# Patient Record
Sex: FEMALE | Race: BLACK OR AFRICAN AMERICAN | Employment: FULL TIME | URBAN - METROPOLITAN AREA
[De-identification: names, ages, dates, MRNs, and addresses within clinical notes are randomized per-mention and may not be internally consistent; named-entity substitution may affect disease eponyms.]

---

## 2017-06-11 ENCOUNTER — HOSPITAL ENCOUNTER (EMERGENCY)
Age: 32
Discharge: HOME | End: 2017-06-11
Attending: EMERGENCY MEDICINE
Payer: COMMERCIAL

## 2017-06-11 ENCOUNTER — APPOINTMENT (OUTPATIENT)
Dept: GENERAL RADIOLOGY | Age: 32
End: 2017-06-11
Attending: EMERGENCY MEDICINE
Payer: COMMERCIAL

## 2017-06-11 VITALS
HEIGHT: 69 IN | OXYGEN SATURATION: 99 % | RESPIRATION RATE: 14 BRPM | DIASTOLIC BLOOD PRESSURE: 96 MMHG | SYSTOLIC BLOOD PRESSURE: 145 MMHG | HEART RATE: 81 BPM | TEMPERATURE: 98 F | BODY MASS INDEX: 34.29 KG/M2 | WEIGHT: 231.48 LBS

## 2017-06-11 DIAGNOSIS — M54.50 ACUTE MIDLINE LOW BACK PAIN WITHOUT SCIATICA: Primary | ICD-10-CM

## 2017-06-11 LAB
APPEARANCE UR: CLEAR
BACTERIA URNS QL MICRO: NEGATIVE /HPF
BILIRUB UR QL: NEGATIVE
COLOR UR: ABNORMAL
EPITH CASTS URNS QL MICRO: ABNORMAL /LPF
GLUCOSE UR STRIP.AUTO-MCNC: NEGATIVE MG/DL
HGB UR QL STRIP: ABNORMAL
KETONES UR QL STRIP.AUTO: NEGATIVE MG/DL
LEUKOCYTE ESTERASE UR QL STRIP.AUTO: NEGATIVE
NITRITE UR QL STRIP.AUTO: NEGATIVE
PH UR STRIP: 5.5 [PH] (ref 5–8)
PROT UR STRIP-MCNC: NEGATIVE MG/DL
RBC #/AREA URNS HPF: ABNORMAL /HPF (ref 0–5)
SP GR UR REFRACTOMETRY: 1.02 (ref 1–1.03)
UA: UC IF INDICATED,UAUC: ABNORMAL
UROBILINOGEN UR QL STRIP.AUTO: 1 EU/DL (ref 0.2–1)
WBC URNS QL MICRO: ABNORMAL /HPF (ref 0–4)

## 2017-06-11 PROCEDURE — 74011250637 HC RX REV CODE- 250/637: Performed by: PHYSICIAN ASSISTANT

## 2017-06-11 PROCEDURE — 81001 URINALYSIS AUTO W/SCOPE: CPT | Performed by: EMERGENCY MEDICINE

## 2017-06-11 PROCEDURE — 72100 X-RAY EXAM L-S SPINE 2/3 VWS: CPT

## 2017-06-11 PROCEDURE — 99283 EMERGENCY DEPT VISIT LOW MDM: CPT

## 2017-06-11 RX ORDER — METHOCARBAMOL 750 MG/1
750 TABLET, FILM COATED ORAL 4 TIMES DAILY
Qty: 20 TAB | Refills: 0 | Status: SHIPPED | OUTPATIENT
Start: 2017-06-11 | End: 2017-07-03

## 2017-06-11 RX ORDER — OXYCODONE AND ACETAMINOPHEN 5; 325 MG/1; MG/1
1 TABLET ORAL
Qty: 10 TAB | Refills: 0 | Status: SHIPPED | OUTPATIENT
Start: 2017-06-11 | End: 2017-07-03

## 2017-06-11 RX ORDER — DIAZEPAM 5 MG/1
5 TABLET ORAL
Status: COMPLETED | OUTPATIENT
Start: 2017-06-11 | End: 2017-06-11

## 2017-06-11 RX ORDER — OXYCODONE AND ACETAMINOPHEN 5; 325 MG/1; MG/1
1 TABLET ORAL
Status: COMPLETED | OUTPATIENT
Start: 2017-06-11 | End: 2017-06-11

## 2017-06-11 RX ADMIN — OXYCODONE HYDROCHLORIDE AND ACETAMINOPHEN 1 TABLET: 5; 325 TABLET ORAL at 14:25

## 2017-06-11 RX ADMIN — DIAZEPAM 5 MG: 5 TABLET ORAL at 14:25

## 2017-06-11 NOTE — ED NOTES
Patient received discharge instructions and prescriptions and work note from [place]; mana; neuro intact; ambulated from ED accompanied by partner who will drive her home

## 2017-06-11 NOTE — LETTER
[address] 
[facility] EMERGENCY DEPT 
[address] [id number] 
[phone] Work/School Note Date: 6/11/2017 To Whom It May concern: 
 
Yuliya Hazel was seen and treated today in the emergency room by the following provider(s): 
Attending Provider: Logan Ledezma MD 
Physician Assistant: VALENTE Fragoso. Tarah Miller  
 
No work 48 hours. Sincerely, VALENTE Fragoso

## 2017-06-11 NOTE — LETTER
[address] 
[facility] EMERGENCY DEPT 
[address] [place] [address] [address] [address] [phone] Work/School Note Date: 6/11/2017 To Whom It May concern: 
 
Mónica Valenzuela was seen and treated today in the emergency room by the following provider(s): 
Attending Provider: Екатерина Flores MD 
Physician Assistant: VALENTE Gracia. Tarah Miller  
 
No work 48 hours. Sincerely, VALENTE Gracia

## 2017-06-11 NOTE — DISCHARGE INSTRUCTIONS
Back Pain: Care Instructions  Your Care Instructions    Back pain has many possible causes. It is often related to problems with muscles and ligaments of the back. It may also be related to problems with the nerves, discs, or bones of the back. Moving, lifting, standing, sitting, or sleeping in an awkward way can strain the back. Sometimes you don't notice the injury until later. Arthritis is another common cause of back pain. Although it may hurt a lot, back pain usually improves on its own within several weeks. Most people recover in 12 weeks or less. Using good home treatment and being careful not to stress your back can help you feel better sooner. Follow-up care is a key part of your treatment and safety. Be sure to make and go to all appointments, and call your doctor if you are having problems. Its also a good idea to know your test results and keep a list of the medicines you take. How can you care for yourself at home? · Sit or lie in positions that are most comfortable and reduce your pain. Try one of these positions when you lie down:  ¨ Lie on your back with your knees bent and supported by large pillows. ¨ Lie on the floor with your legs on the seat of a sofa or chair. Parth Cherie on your side with your knees and hips bent and a pillow between your legs. ¨ Lie on your stomach if it does not make pain worse. · Do not sit up in bed, and avoid soft couches and twisted positions. Bed rest can help relieve pain at first, but it delays healing. Avoid bed rest after the first day of back pain. · Change positions every 30 minutes. If you must sit for long periods of time, take breaks from sitting. Get up and walk around, or lie in a comfortable position. · Try using a heating pad on a low or medium setting for 15 to 20 minutes every 2 or 3 hours. Try a warm shower in place of one session with the heating pad. · You can also try an ice pack for 10 to 15 minutes every 2 to 3 hours.  Put a thin cloth between the ice pack and your skin. · Take pain medicines exactly as directed. ¨ If the doctor gave you a prescription medicine for pain, take it as prescribed. ¨ If you are not taking a prescription pain medicine, ask your doctor if you can take an over-the-counter medicine. · Take short walks several times a day. You can start with 5 to 10 minutes, 3 or 4 times a day, and work up to longer walks. Walk on level surfaces and avoid hills and stairs until your back is better. · Return to work and other activities as soon as you can. Continued rest without activity is usually not good for your back. · To prevent future back pain, do exercises to stretch and strengthen your back and stomach. Learn how to use good posture, safe lifting techniques, and proper body mechanics. When should you call for help? Call your doctor now or seek immediate medical care if:  · You have new or worsening numbness in your legs. · You have new or worsening weakness in your legs. (This could make it hard to stand up.)  · You lose control of your bladder or bowels. Watch closely for changes in your health, and be sure to contact your doctor if:  · Your pain gets worse. · You are not getting better after 2 weeks. Where can you learn more? Go to http://deonte-leah.info/. Enter Y310 in the search box to learn more about \"Back Pain: Care Instructions. \"  Current as of: May 23, 2016  Content Version: 11.2  © 0398-9859 [employer]. Care instructions adapted under license by [employer] (which disclaims liability or warranty for this information). If you have questions about a medical condition or this instruction, always ask your healthcare professional. [address] any warranty or liability for your use of this information. Deciding About Spinal Manipulation for Low Back Pain  What is spinal manipulation?   Spinal manipulation is a treatment that uses pressure on a joint of the spine. It is also called spinal adjustment. This treatment is used to improve pain and function. Manipulation can be done with the hands or a special device. The careful, controlled force used on the joint can range from gentle to strong, and from slow to rapid. Sometimes other joints of the body are also worked on to help treat the spine. Health care providers who are commonly trained do spinal manipulation include:  · Chiropractic doctors. · Osteopathic doctors. · Physical therapists. · Medical doctors, such as physiatrists. What are burt points about this decision? · Spinal manipulation is done to relieve pain and help the body function better. Like most low back pain treatments, spinal manipulation works for some people but not for others. · Spinal manipulation generally works well to help low back pain. It is safe when performed by a trained health care provider. · All treatments for low back pain have possible side effects. After spinal manipulation, some people feel tired or sore. Serious problems are very rare. · Most people with new low back pain get better with good home treatment like ice, staying active, and doing certain exercises. In 4 to 6 weeks, most people feel better. · No matter what back pain treatment you choose, learn how to take care of your back. Stay active and do exercises that help your muscles better support your joints. Good self-care will help your back stay strong and help you feel better. Why might you choose spinal manipulation? · You want a treatment that involves hands-on contact. · You have tried home treatment for a few weeks, and your pain is not better. · Many people get pain relief from spinal manipulation. · You want a treatment that does not involve medicines. Why might you choose not to have spinal manipulation? · You are not comfortable with hands-on contact. · Most new low back pain improves with time.  You want to wait and see if your pain gets better with good home care. Home care includes ice, staying active, and doing certain exercises. · Like other back pain treatments, spinal manipulation works for some people, but not for others. · Even though serious side effects are very rare, you still worry that they could happen to you. Your decision  Thinking about the facts and your feelings can help you make a decision that is right for you. Be sure you understand the benefits and risks of your options, and think about what else you need to do before you make the decision. Where can you learn more? Go to http://deonte-leah.info/. Enter V561 in the search box to learn more about \"Deciding About Spinal Manipulation for Low Back Pain. \"  Current as of: May 23, 2016  Content Version: 11.2  © 6635-9574 [employer]. Care instructions adapted under license by [employer] (which disclaims liability or warranty for this information). If you have questions about a medical condition or this instruction, always ask your healthcare professional. [place] [address] any warranty or liability for your use of this information. Deciding About Spinal Manipulation for Low Back Pain  What is spinal manipulation? Spinal manipulation is a treatment that uses pressure on a joint of the spine. It is also called spinal adjustment. This treatment is used to improve pain and function. Manipulation can be done with the hands or a special device. The careful, controlled force used on the joint can range from gentle to strong, and from slow to rapid. Sometimes other joints of the body are also worked on to help treat the spine. Health care providers who are commonly trained do spinal manipulation include:  · Chiropractic doctors. · Osteopathic doctors. · Physical therapists. · Medical doctors, such as physiatrists. What are burt points about this decision?   · Spinal manipulation is done to relieve pain and help the body function better. Like most low back pain treatments, spinal manipulation works for some people but not for others. · Spinal manipulation generally works well to help low back pain. It is safe when performed by a trained health care provider. · All treatments for low back pain have possible side effects. After spinal manipulation, some people feel tired or sore. Serious problems are very rare. · Most people with new low back pain get better with good home treatment like ice, staying active, and doing certain exercises. In 4 to 6 weeks, most people feel better. · No matter what back pain treatment you choose, learn how to take care of your back. Stay active and do exercises that help your muscles better support your joints. Good self-care will help your back stay strong and help you feel better. Why might you choose spinal manipulation? · You want a treatment that involves hands-on contact. · You have tried home treatment for a few weeks, and your pain is not better. · Many people get pain relief from spinal manipulation. · You want a treatment that does not involve medicines. Why might you choose not to have spinal manipulation? · You are not comfortable with hands-on contact. · Most new low back pain improves with time. You want to wait and see if your pain gets better with good home care. Home care includes ice, staying active, and doing certain exercises. · Like other back pain treatments, spinal manipulation works for some people, but not for others. · Even though serious side effects are very rare, you still worry that they could happen to you. Your decision  Thinking about the facts and your feelings can help you make a decision that is right for you. Be sure you understand the benefits and risks of your options, and think about what else you need to do before you make the decision. Where can you learn more? Go to http://deonte-leah.info/.   Enter D771 in the search box to learn more about \"Deciding About Spinal Manipulation for Low Back Pain. \"  Current as of: May 23, 2016  Content Version: 11.2  © 9309-8953 [employer]. Care instructions adapted under license by [employer] (which disclaims liability or warranty for this information). If you have questions about a medical condition or this instruction, always ask your healthcare professional. [place] [address] any warranty or liability for your use of this information.

## 2017-06-11 NOTE — ED PROVIDER NOTES
HPI Comments: Gita Deras is a 28 y.o. female with no significant PMHx who presents ambulatory to [address] [facility] ED with cc of acute \"achy\" and constant lower back pain with no radiation x 2 months. Pt did have similar pain a few months ago which resolved without medical intervention. Pt has not been to a physicians office to get evaluated. Pt denies any imaging studies of her back. Pt specifically denies any fall, trauma, Numbness or tingling in the lower extremities, urinary or fecal incontinence, saddle anesthesia, nausea, vomiting. PCP: None    Social Hx: - tobacco (former smoker), +EtOH (socially), - illicit drugs (-). There are no other complaints, changes or physical findings at this time. The history is provided by the patient. No  was used. No past medical history on file. No past surgical history on file. Family History:   Problem Relation Age of Onset    Hypertension Father     Diabetes Father     Hypertension Paternal Aunt     Diabetes Paternal Aunt     Diabetes Paternal Grandmother     Hypertension Paternal Grandmother        Social History     Social History    Marital status: SINGLE     Spouse name: N/A    Number of children: N/A    Years of education: N/A     Occupational History    Not on file. Social History Main Topics    Smoking status: Former Smoker     Packs/day: 1.00    Smokeless tobacco: Never Used    Alcohol use Yes      Comment: socially    Drug use: No    Sexual activity: No     Other Topics Concern    Not on file     Social History Narrative         ALLERGIES: Review of patient's allergies indicates no known allergies. Review of Systems   Constitutional: Negative. Negative for activity change, appetite change, chills, fatigue, fever and unexpected weight change. HENT: Negative. Negative for congestion, hearing loss, rhinorrhea, sneezing and voice change. Eyes: Negative. Negative for pain and visual disturbance. Respiratory: Negative. Negative for apnea, cough, choking, chest tightness and shortness of breath. Cardiovascular: Negative. Negative for chest pain and palpitations. Gastrointestinal: Negative. Negative for abdominal distention, abdominal pain, blood in stool, diarrhea, nausea and vomiting. Genitourinary: Negative. Negative for difficulty urinating, flank pain, frequency and urgency. Musculoskeletal: Positive for back pain. Negative for arthralgias, myalgias and neck stiffness. Skin: Negative. Negative for color change and rash. Neurological: Negative. Negative for dizziness, seizures, syncope, speech difficulty, weakness, numbness and headaches. Negative for saddle anesthesia  Negative for urinary or fecal incontinence   Hematological: Negative for adenopathy. Psychiatric/Behavioral: Negative. Negative for agitation, behavioral problems, dysphoric mood and suicidal ideas. The patient is not nervous/anxious. Vitals:    06/11/17 1242   BP: (!) 145/96   Pulse: 81   Resp: 14   Temp: 98 °F (36.7 °C)   SpO2: 99%   Weight: 105 kg (231 lb 7.7 oz)   Height: 5' 9\" (1.753 m)            Physical Exam   Constitutional: She is oriented to person, place, and time. She appears well-developed and well-nourished. No distress. HENT:   Head: Normocephalic and atraumatic. Right Ear: External ear normal.   Left Ear: External ear normal.   Nose: Nose normal.   Mouth/Throat: Oropharynx is clear and moist. No oropharyngeal exudate. Eyes: Conjunctivae and EOM are normal. Pupils are equal, round, and reactive to light. Right eye exhibits no discharge. Left eye exhibits no discharge. No scleral icterus. Neck: Normal range of motion. Neck supple. No JVD present. No tracheal deviation present. Cardiovascular: Normal rate, regular rhythm, normal heart sounds and intact distal pulses. Exam reveals no gallop and no friction rub. No murmur heard.   Pulmonary/Chest: Effort normal and breath sounds normal. No respiratory distress. She has no wheezes. She has no rales. She exhibits no tenderness. Abdominal: Soft. Bowel sounds are normal. She exhibits no distension and no mass. There is no tenderness. There is no rebound and no guarding. Musculoskeletal: Normal range of motion. She exhibits tenderness. She exhibits no edema. Tenderness lower back. straight leg-raise intact  Neurovascularly intact. Lymphadenopathy:     She has no cervical adenopathy. Neurological: She is alert and oriented to person, place, and time. She has normal reflexes. No cranial nerve deficit. She exhibits normal muscle tone. Coordination normal.   No urinary or fecal incontinence. Skin: Skin is warm and dry. She is not diaphoretic. Psychiatric: She has a normal mood and affect. Her behavior is normal. Judgment and thought content normal.   Nursing note and vitals reviewed. MDM  Number of Diagnoses or Management Options  Acute midline low back pain without sciatica:   Diagnosis management comments: DDx: degenerative disc disease, degenerative joint disease, Sciatica.          Amount and/or Complexity of Data Reviewed  Clinical lab tests: ordered and reviewed  Tests in the radiology section of CPT®: ordered and reviewed  Review and summarize past medical records: yes    Patient Progress  Patient progress: stable    ED Course       Procedures    LABORATORY TESTS:  Recent Results (from the past 12 hour(s))   URINALYSIS W/ REFLEX CULTURE    Collection Time: 06/11/17  1:03 PM   Result Value Ref Range    Color YELLOW/STRAW      Appearance CLEAR CLEAR      Specific gravity 1.022 1.003 - 1.030      pH (UA) 5.5 5.0 - 8.0      Protein NEGATIVE  NEG mg/dL    Glucose NEGATIVE  NEG mg/dL    Ketone NEGATIVE  NEG mg/dL    Bilirubin NEGATIVE  NEG      Blood LARGE (A) NEG      Urobilinogen 1.0 0.2 - 1.0 EU/dL    Nitrites NEGATIVE  NEG      Leukocyte Esterase NEGATIVE  NEG      WBC 0-4 0 - 4 /hpf    RBC 0-5 0 - 5 /hpf    Epithelial cells FEW FEW /lpf    Bacteria NEGATIVE  NEG /hpf    UA:UC IF INDICATED CULTURE NOT INDICATED BY UA RESULT CNI         IMAGING RESULTS:  XR SPINE LUMB 2 OR 3 V   Final Result   Study Result      EXAM: XR SPINE LUMB 2 OR 3 V     INDICATION: Back pain for over one month.     COMPARISON: None.     TECHNIQUE: 3 views lumbar spine.     FINDINGS: Vertebral body heights and intervertebral disc spaces are maintained. There is no abnormality in alignment.     IMPRESSION  IMPRESSION: No acute abnormality. MEDICATIONS GIVEN:  Medications   oxyCODONE-acetaminophen (PERCOCET) 5-325 mg per tablet 1 Tab (1 Tab Oral Given 6/11/17 1425)   diazePAM (VALIUM) tablet 5 mg (5 mg Oral Given 6/11/17 1425)       IMPRESSION:  1. Acute midline low back pain without sciatica        PLAN:  1. Discharge Medication List as of 6/11/2017  2:42 PM      START taking these medications    Details   oxyCODONE-acetaminophen (PERCOCET) 5-325 mg per tablet Take 1 Tab by mouth every six (6) hours as needed for Pain. Max Daily Amount: 4 Tabs., Print, Disp-10 Tab, R-0      methocarbamol (ROBAXIN-750) 750 mg tablet Take 1 Tab by mouth four (4) times daily. , Normal, Disp-20 Tab, R-0         CONTINUE these medications which have NOT CHANGED    Details   terconazole (TERAZOL 3) 0.8 % vaginal cream Insert 1 Applicator into vagina nightly., Disp-20 g, R-0, Normal      prenatal vit-iron fumarate-fa 27-0.8 mg tab tablet Take 1 Tab by mouth daily. Indications: PREVENTION OF FETAL NEURAL TUBE DEFECTS, Normal, Disp-100 Tab, R-6      diazepam (VALIUM) 5 mg tablet Take 0.5 Tabs by mouth every eight (8) hours as needed (Pain). , Print, Disp-5 Tab, R-0           2.    Follow-up Information     Follow up With Details Comments [address], [place] In 2 days As needed [address] [place] [address]  [facility]  [phone]          Return to ED if worse     DISCHARGE NOTE  2:42 PM  The patient has been re-evaluated and is ready for discharge. Reviewed available results with patient. Counseled pt on diagnosis and care plan. Pt has expressed understanding, and all questions have been answered. Pt agrees with plan and agrees to F/U as recommended, or return to the ED if their sxs worsen. Discharge instructions have been provided and explained to the pt, along with reasons to return to the ED. This note is prepared by Suzie Cha acting as Scribe for Santiago Ta : The scribe's documentation has been prepared under my direction and personally reviewed by me in its entirety. I confirm that the note above accurately reflects all work, treatment, procedures, and medical decision making performed by me.

## 2017-07-03 ENCOUNTER — APPOINTMENT (OUTPATIENT)
Dept: GENERAL RADIOLOGY | Age: 32
End: 2017-07-03
Attending: PHYSICIAN ASSISTANT
Payer: SELF-PAY

## 2017-07-03 ENCOUNTER — HOSPITAL ENCOUNTER (EMERGENCY)
Age: 32
Discharge: HOME | End: 2017-07-03
Attending: EMERGENCY MEDICINE
Payer: SELF-PAY

## 2017-07-03 VITALS
WEIGHT: 232.81 LBS | HEIGHT: 67 IN | TEMPERATURE: 98.2 F | DIASTOLIC BLOOD PRESSURE: 94 MMHG | HEART RATE: 71 BPM | RESPIRATION RATE: 18 BRPM | SYSTOLIC BLOOD PRESSURE: 136 MMHG | OXYGEN SATURATION: 97 % | BODY MASS INDEX: 36.54 KG/M2

## 2017-07-03 DIAGNOSIS — S91.312A FOOT LACERATION, LEFT, INITIAL ENCOUNTER: Primary | ICD-10-CM

## 2017-07-03 PROCEDURE — 77030018836 HC SOL IRR NACL ICUM -A

## 2017-07-03 PROCEDURE — 74011000250 HC RX REV CODE- 250: Performed by: PHYSICIAN ASSISTANT

## 2017-07-03 PROCEDURE — 74011250637 HC RX REV CODE- 250/637: Performed by: PHYSICIAN ASSISTANT

## 2017-07-03 PROCEDURE — 73630 X-RAY EXAM OF FOOT: CPT

## 2017-07-03 PROCEDURE — 75810000293 HC SIMP/SUPERF WND  RPR

## 2017-07-03 PROCEDURE — 99281 EMR DPT VST MAYX REQ PHY/QHP: CPT

## 2017-07-03 PROCEDURE — 77030031132 HC SUT NYL COVD -A

## 2017-07-03 RX ORDER — LIDOCAINE HYDROCHLORIDE AND EPINEPHRINE 20; 5 MG/ML; UG/ML
10 INJECTION, SOLUTION EPIDURAL; INFILTRATION; INTRACAUDAL; PERINEURAL
Status: COMPLETED | OUTPATIENT
Start: 2017-07-03 | End: 2017-07-03

## 2017-07-03 RX ORDER — CEPHALEXIN 500 MG/1
500 CAPSULE ORAL 4 TIMES DAILY
Qty: 40 CAP | Refills: 0 | Status: SHIPPED | OUTPATIENT
Start: 2017-07-03 | End: 2017-07-13

## 2017-07-03 RX ORDER — OXYCODONE AND ACETAMINOPHEN 5; 325 MG/1; MG/1
2 TABLET ORAL
Status: COMPLETED | OUTPATIENT
Start: 2017-07-03 | End: 2017-07-03

## 2017-07-03 RX ORDER — OXYCODONE AND ACETAMINOPHEN 5; 325 MG/1; MG/1
1 TABLET ORAL
Qty: 20 TAB | Refills: 0 | Status: SHIPPED | OUTPATIENT
Start: 2017-07-03 | End: 2018-03-12 | Stop reason: ALTCHOICE

## 2017-07-03 RX ADMIN — LIDOCAINE HYDROCHLORIDE,EPINEPHRINE BITARTRATE 200 MG: 20; .005 INJECTION, SOLUTION EPIDURAL; INFILTRATION; INTRACAUDAL; PERINEURAL at 14:34

## 2017-07-03 RX ADMIN — OXYCODONE HYDROCHLORIDE AND ACETAMINOPHEN 2 TABLET: 5; 325 TABLET ORAL at 14:03

## 2017-07-03 NOTE — DISCHARGE INSTRUCTIONS
Cuts: Care Instructions  Your Care Instructions  A cut can happen anywhere on your body. Stitches, staples, skin adhesives, or pieces of tape called Steri-Strips are sometimes used to keep the edges of a cut together and help it heal. Steri-Strips can be used by themselves or with stitches or staples. Sometimes cuts are left open. If the cut went deep and through the skin, the doctor may have closed the cut in two layers. A deeper layer of stitches brings the deep part of the cut together. These stitches will dissolve and don't need to be removed. The upper layer closure, which could be stitches, staples, Steri-Strips, or adhesive, is what you see on the cut. A cut is often covered by a bandage. The doctor has checked you carefully, but problems can develop later. If you notice any problems or new symptoms, get medical treatment right away. Follow-up care is a key part of your treatment and safety. Be sure to make and go to all appointments, and call your doctor if you are having problems. It's also a good idea to know your test results and keep a list of the medicines you take. How can you care for yourself at home? If a cut is open or closed  · Prop up the sore area on a pillow anytime you sit or lie down during the next 3 days. Try to keep it above the level of your heart. This will help reduce swelling. · Keep the cut dry for the first 24 to 48 hours. After this, you can shower if your doctor okays it. Pat the cut dry. · Don't soak the cut, such as in a bathtub. Your doctor will tell you when it's safe to get the cut wet. · After the first 24 to 48 hours, clean the cut with soap and water 2 times a day unless your doctor gives you different instructions. ¨ Don't use hydrogen peroxide or alcohol, which can slow healing. ¨ You may cover the cut with a thin layer of petroleum jelly and a nonstick bandage.   ¨ If the doctor put a bandage over the cut, put on a new bandage after cleaning the cut or if the bandage gets wet or dirty. · Avoid any activity that could cause your cut to reopen. · Be safe with medicines. Read and follow all instructions on the label. ¨ If the doctor gave you a prescription medicine for pain, take it as prescribed. ¨ If you are not taking a prescription pain medicine, ask your doctor if you can take an over-the-counter medicine. If the cut is closed with stitches, staples, or Steri-Strips  · Follow the above instructions for open or closed cuts. · Do not remove the stitches or staples on your own. Your doctor will tell you when to come back to have the stitches or staples removed. · Leave Steri-Strips on until they fall off. If the cut is closed with a skin adhesive  · Follow the above instructions for open or closed cuts. · Leave the skin adhesive on your skin until it falls off on its own. This may take 5 to 10 days. · Do not scratch, rub, or pick at the adhesive. · Do not put the sticky part of a bandage directly on the adhesive. · Do not put any kind of ointment, cream, or lotion over the area. This can make the adhesive fall off too soon. Do not use hydrogen peroxide or alcohol, which can slow healing. When should you call for help? Call your doctor now or seek immediate medical care if:  · You have new pain, or your pain gets worse. · The skin near the cut is cold or pale or changes color. · You have tingling, weakness, or numbness near the cut. · The cut starts to bleed, and blood soaks through the bandage. Oozing small amounts of blood is normal.  · You have trouble moving the area near the cut. · You have symptoms of infection, such as:  ¨ Increased pain, swelling, warmth, or redness around the cut. ¨ Red streaks leading from the cut. ¨ Pus draining from the cut. ¨ A fever. Watch closely for changes in your health, and be sure to contact your doctor if:  · The cut reopens. · You do not get better as expected. Where can you learn more?   Go to http://deonte-leah.info/. Enter M735 in the search box to learn more about \"Cuts: Care Instructions. \"  Current as of: March 20, 2017  Content Version: 11.3  © 0744-9229 [employer]. Care instructions adapted under license by [employer] (which disclaims liability or warranty for this information). If you have questions about a medical condition or this instruction, always ask your healthcare professional. [place] [address] any warranty or liability for your use of this information.

## 2017-07-03 NOTE — ED PROVIDER NOTES
HPI Comments: Paula Manning is a 28 y.o. female, no significant pmhx, who presents ambulatory to the ED c/o worsening R foot pain with gapping laceration to dorsum of R foot x 1300 yesterday. 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 Pt was sent to [facility] by her employer where she received unremarkable XR, had the laceration cleaned and dressed and was advised to follow up with general surgery. Pt had not received a call from a surgeon as they said she would, so she called back [facility] today as her pain was worsening, and they advised her to come into the ED [facility] ED. Pt's tetanus shot was updated last night. PCP: None      Social Hx: -tobacco (former), +EtOH (socially), -Illicit Drugs   FHx: no pertinent family hx   Medication Allergies: none      There are no other complaints, changes, or physical findings at this time. The history is provided by the patient. History reviewed. No pertinent past medical history. History reviewed. No pertinent surgical history. Family History:   Problem Relation Age of Onset    Hypertension Father     Diabetes Father     Hypertension Paternal Aunt     Diabetes Paternal Aunt     Diabetes Paternal Grandmother     Hypertension Paternal Grandmother        Social History     Social History    Marital status: SINGLE     Spouse name: N/A    Number of children: N/A    Years of education: N/A     Occupational History    Not on file. Social History Main Topics    Smoking status: Former Smoker     Packs/day: 1.00    Smokeless tobacco: Never Used    Alcohol use Yes      Comment: socially    Drug use: No    Sexual activity: No     Other Topics Concern    Not on file     Social History Narrative         ALLERGIES: Review of patient's allergies indicates no known allergies. Review of Systems   Constitutional: Negative for fatigue and fever.    HENT: Negative for ear pain and sore throat. Eyes: Negative for pain, redness and visual disturbance. Respiratory: Negative for cough and shortness of breath. Cardiovascular: Negative for chest pain and palpitations. Gastrointestinal: Negative for abdominal pain, nausea and vomiting. Genitourinary: Negative for dysuria, frequency and urgency. Musculoskeletal: Positive for myalgias (R foot). Negative for back pain, gait problem, neck pain and neck stiffness. Skin: Positive for wound (gapping laceration to dorsum of R foot). Negative for rash. Neurological: Negative for dizziness, weakness, light-headedness, numbness and headaches. All other systems reviewed and are negative. Patient Vitals for the past 12 hrs:   Temp Pulse Resp BP SpO2   07/03/17 1330 98.2 °F (36.8 °C) 71 18 (!) 136/94 97 %       Physical Exam   Constitutional: She is oriented to person, place, and time. She appears well-developed and well-nourished. Non-toxic appearance. No distress. HENT:   Head: Normocephalic and atraumatic. Right Ear: External ear normal.   Left Ear: External ear normal.   Nose: Nose normal.   Mouth/Throat: Uvula is midline. No trismus in the jaw. Eyes: Conjunctivae and EOM are normal. Pupils are equal, round, and reactive to light. No scleral icterus. Neck: Normal range of motion and full passive range of motion without pain. Cardiovascular: Normal rate and regular rhythm. Pulmonary/Chest: Effort normal. No accessory muscle usage. No tachypnea. No respiratory distress. She has no decreased breath sounds. She has no wheezes. Abdominal: Soft. There is no tenderness. Musculoskeletal: Normal range of motion. Neurological: She is alert and oriented to person, place, and time. She is not disoriented. No cranial nerve deficit. GCS eye subscore is 4. GCS verbal subscore is 5. GCS motor subscore is 6. Skin: Skin is intact. No rash noted.    4cm x 1 cm gapping laceration to the dorsum of her R foot    Psychiatric: She has a normal mood and affect. Her speech is normal.   Nursing note and vitals reviewed. MDM  Number of Diagnoses or Management Options  Foot laceration, left, initial encounter:   Diagnosis management comments: Tetanus UTD  Plain films are negative  Laceration repair with loose approximation as below  Wound care instructions. Return precautions. Abx and pain medication rx'd         Amount and/or Complexity of Data Reviewed  Tests in the radiology section of CPT®: reviewed and ordered  Review and summarize past medical records: yes  Independent visualization of images, tracings, or specimens: yes    Patient Progress  Patient progress: stable    ED Course       Procedures  Procedure Note - Laceration Repair:  4:14 PM  Procedure by HUYEN Carr. Complexity: simple  4 cm gapping laceration to R foot  was irrigated copiously with NS under jet lavage, prepped with Chlorprep and draped in a sterile fashion. The area was anesthetized with 10 mLs of  Lidocaine 2% with epinephrine via local infiltration. The wound was explored with the following results: No foreign bodies found. The wound was repaired with One layer suture closure: Skin Layer:  4 sutures placed, stitch type:loose approximation, suture: 4-0 nylon. The wound was closed with good hemostasis and approximation. Sterile dressing applied. Estimated blood loss: <1 mL  The procedure took 16-30 minutes, and pt tolerated well. Written by Tito Key, ED Scribe, as dictated by Elen Jiang. DISCHARGE NOTE:  IMAGING RESULTS:  INDICATION: injury. Dorsal right foot laceration.      Exam: AP, lateral, oblique views of the right foot.     FINDINGS: There is no acute fracture or dislocation. Visualized articulations  are normal. Bones are well-mineralized.  Soft tissues are normal. No radiodense  foreign body is seen.     IMPRESSION  IMPRESSION: No acute fracture or dislocation.      MEDICATIONS GIVEN:  Medications   lidocaine-EPINEPHrine (PF) (XYLOCAINE) 2 %-1:200,000 injection 200 mg (not administered)   oxyCODONE-acetaminophen (PERCOCET) 5-325 mg per tablet 2 Tab (2 Tabs Oral Given 7/3/17 1403)       IMPRESSION:  1. Foot laceration, left, initial encounter        PLAN:  Current Discharge Medication List      START taking these medications    Details   cephALEXin (KEFLEX) 500 mg capsule Take 1 Cap by mouth four (4) times daily for 10 days. Qty: 40 Cap, Refills: 0      oxyCODONE-acetaminophen (PERCOCET) 5-325 mg per tablet Take 1 Tab by mouth every four (4) hours as needed for Pain. Max Daily Amount: 6 Tabs. Qty: 20 Tab, Refills: 0           Follow-up Information     Follow up With Details Comments Contact Info    Emma Ridley DPM Schedule an appointment as soon as possible for a visit PODIATRY: call to schedule follow up [address] [place] [address]  [phone]          Return to ED if worse     DISCHARGE NOTE:  4:23 PM  Pt has been reexamined. Pt has no new complaints, changes, or physical findings. Care plan outlined and precautions discussed. All available results reviewed with pt. All medications reviewed with pt. All of pts questions and concerns addressed. Pt agrees to f/u as instructed and agrees to return to ED upon further deterioration. Pt is ready to go home. Written by Cleveland Martinez ED Scribe as dictated by Yamilet Talbert    ATTESTATION   This note is prepared by Cleveland Martinez acting as scribe for HUYEN Lopez : The scribe's documentation has been prepared under my direction and personally reviewed by me in its entirety. I confirm that the note above accurately reflects all work, treatment, procedures, and medical decision making performed by me.

## 2017-07-03 NOTE — ED NOTES
R foot with approx 3 inch laceration to dorsal region with minimal serous drainage. 2+ edema with palpable pedal and post tib pulses.

## 2017-07-03 NOTE — LETTER
[address] 
[facility] EMERGENCY DEPT 
[address] [place] [address] [address] 
[phone] Work/School Note Date: 7/3/2017 To Whom It May concern: 
 
Nettie Santiago was seen and treated today in the emergency room by the following provider(s): 
Attending Provider: Damaso Virk. Kristie Houston MD 
Physician Assistant: VALENTE Metcalf. Tarah Garcia may return to work on [id number]. Follow up with Podiatry. Sincerely, VALENTE Metcafl

## 2017-07-03 NOTE — ED NOTES
Discharge instructions reviewed with pt and given by CASH Garcia. Ambulated with steady gait out of ED after declining wheelchair ride out. No other complaints voiced at this time. Accompanied by 2 adult female visitors.

## 2017-07-13 ENCOUNTER — HOSPITAL ENCOUNTER (EMERGENCY)
Age: 32
Discharge: HOME | End: 2017-07-13
Attending: EMERGENCY MEDICINE | Admitting: EMERGENCY MEDICINE
Payer: SELF-PAY

## 2017-07-13 VITALS
HEART RATE: 79 BPM | RESPIRATION RATE: 16 BRPM | HEIGHT: 67 IN | DIASTOLIC BLOOD PRESSURE: 86 MMHG | SYSTOLIC BLOOD PRESSURE: 125 MMHG | TEMPERATURE: 98.1 F | BODY MASS INDEX: 35.16 KG/M2 | WEIGHT: 223.99 LBS | OXYGEN SATURATION: 100 %

## 2017-07-13 DIAGNOSIS — Z48.02 VISIT FOR SUTURE REMOVAL: Primary | ICD-10-CM

## 2017-07-13 PROCEDURE — 75810000275 HC EMERGENCY DEPT VISIT NO LEVEL OF CARE

## 2017-07-13 PROCEDURE — 77030008027

## 2017-07-13 PROCEDURE — 74011250637 HC RX REV CODE- 250/637: Performed by: PHYSICIAN ASSISTANT

## 2017-07-13 RX ORDER — IBUPROFEN 800 MG/1
800 TABLET ORAL
Qty: 20 TAB | Refills: 0 | Status: SHIPPED | OUTPATIENT
Start: 2017-07-13 | End: 2017-07-20

## 2017-07-13 RX ADMIN — BACITRACIN ZINC, NEOMYCIN SULFATE, POLYMYXIN B SULFATE 1 PACKET: 3.5; 5000; 4 OINTMENT TOPICAL at 12:38

## 2017-07-13 NOTE — ED PROVIDER NOTES
HPI Comments: Tammy Wei is a 28 y.o. female who presents ambulatory to [facility] ED for suture removal after having 4 sutures placed in her right foot (7/3/17). 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 She states she has been ambulatory since injury. Pt reports she has been compliant with her prescribed medications, including Keflex. She is otherwise without complaint. PCP: None    There are no other complaints, changes, or physical findings at this time. The history is provided by the patient. History reviewed. No pertinent past medical history. History reviewed. No pertinent surgical history. Family History:   Problem Relation Age of Onset    Hypertension Father     Diabetes Father     Hypertension Paternal Aunt     Diabetes Paternal Aunt     Diabetes Paternal Grandmother     Hypertension Paternal Grandmother        Social History     Social History    Marital status: SINGLE     Spouse name: N/A    Number of children: N/A    Years of education: N/A     Occupational History    Not on file. Social History Main Topics    Smoking status: Former Smoker     Packs/day: 1.00    Smokeless tobacco: Never Used    Alcohol use Yes      Comment: socially    Drug use: No    Sexual activity: No     Other Topics Concern    Not on file     Social History Narrative         ALLERGIES: Review of patient's allergies indicates no known allergies. Review of Systems   Constitutional: Negative. Negative for activity change, appetite change, chills, diaphoresis, fever and unexpected weight change. HENT: Negative for congestion, hearing loss, rhinorrhea, sinus pressure, sneezing, sore throat and trouble swallowing. Eyes: Negative for pain, redness, itching and visual disturbance. Respiratory: Negative for cough, shortness of breath and wheezing. Cardiovascular: Negative for chest pain, palpitations and leg swelling. Gastrointestinal: Negative for abdominal pain, constipation, diarrhea, nausea and vomiting. Genitourinary: Negative for dysuria. Musculoskeletal: Negative for arthralgias, gait problem and myalgias. Skin: Negative for color change, pallor and rash. +suture removal right foot   Neurological: Negative for tremors, weakness, light-headedness, numbness and headaches. All other systems reviewed and are negative. Patient Vitals for the past 12 hrs:   Temp Pulse Resp BP SpO2   07/13/17 1215 98.1 °F (36.7 °C) 79 16 125/86 100 %            Physical Exam   Constitutional: She is oriented to person, place, and time. Vital signs are normal. She appears well-developed and well-nourished. No distress. 28 y.o.  female in NAD  Communicates appropriately and in full sentences   HENT:   Head: Normocephalic and atraumatic. Eyes: Conjunctivae are normal. Pupils are equal, round, and reactive to light. Right eye exhibits no discharge. Left eye exhibits no discharge. Neck: Normal range of motion. Neck supple. No nuchal rigidity or meningeal signs   Cardiovascular:   Strong DP pulses   Pulmonary/Chest: Effort normal. No respiratory distress. Musculoskeletal: Normal range of motion. She exhibits no edema, tenderness or deformity. No neurologic, motor, vascular, or compartment embarrassment observed on exam. No focal neurologic deficits. Neurological: She is alert and oriented to person, place, and time. NVI   Skin: Skin is warm and dry. No rash noted. She is not diaphoretic. No erythema. No pallor. 4 cm well-healing laceration to dorsum on right foot with 4 simple interrupted sutures in place; no active drainage   Psychiatric: She has a normal mood and affect. Her behavior is normal.   Nursing note and vitals reviewed.        MDM  Number of Diagnoses or Management Options  Visit for suture removal:   Diagnosis management comments: DDx: suture removal       Amount and/or Complexity of Data Reviewed  Review and summarize past medical records: yes      ED Course       Procedures    I reviewed our electronic medical record system for any past medical records that were available that may contribute to the patients current condition, the nursing notes and vital signs from today's visit     Nursing notes will be reviewed as they become available in realtime while the pt is in the ED. Progress Note:  12:27 PM  The patients presenting problems have been discussed, and they are in agreement with the care plan formulated and outlined with them. I have encouraged them to ask questions as they arise throughout their visit. Will continue to monitor. Procedure Note - Suture Removal  12:30 PM   Performed by: [facility] [employer], HUYEN  4 suture (s) were removed from right foot. No signs/sxs of infection noted. Wound healing well. Sutures removed without incident or complications. The procedure took 1-15 minutes, and pt tolerated well. DISCHARGE NOTE:  12:32 PM  Tarah TSE Paul's  results have been reviewed with her. She has been counseled regarding her diagnosis. She verbally conveys understanding and agreement of the signs, symptoms, diagnosis, treatment and prognosis and additionally agrees to follow up as recommended with Dr. None in 24 - 48 hours. She also agrees with the care-plan and conveys that all of her questions have been answered. I have also put together some discharge instructions for her that include: 1) educational information regarding their diagnosis, 2) how to care for their diagnosis at home, as well a 3) list of reasons why they would want to return to the ED prior to their follow-up appointment, should their condition change. She and/or family's questions have been answered.  I have encouraged them to see the official results in [facility]\" or to retrieve the specifics of their results from medical records. CLINICAL IMPRESSION:  1. Visit for suture removal        Plan:  1. Return precautions  2. Medications as prescribed  3. Follow-ups as discussed  Discharge Medication List as of 7/13/2017 12:42 PM      START taking these medications    Details   ibuprofen (MOTRIN) 800 mg tablet Take 1 Tab by mouth every six (6) hours as needed for Pain for up to 7 days. , Normal, Disp-20 Tab, R-0         CONTINUE these medications which have NOT CHANGED    Details   cephALEXin (KEFLEX) 500 mg capsule Take 1 Cap by mouth four (4) times daily for 10 days. , Print, Disp-40 Cap, R-0      oxyCODONE-acetaminophen (PERCOCET) 5-325 mg per tablet Take 1 Tab by mouth every four (4) hours as needed for Pain. Max Daily Amount: 6 Tabs., Print, Disp-20 Tab, R-0           Follow-up Information     Follow up With Details Comments Contact Info    None Schedule an appointment as soon as possible for a visit in 2 days As needed, If symptoms worsen, Possible further evaluation and treatment None ([phone]) Patient stated that they have no PCP      [facility] EMERGENCY DEPT Go to As needed, If symptoms worsen [address] [facility] [address] [phone]    Princess Joaquin DPM Schedule an appointment as soon as possible for a visit in 2 days As needed, If symptoms worsen, Possible further evaluation and treatment Remi Ferrara  [phone]          Return to the closest emergency room or follow up sooner for any deterioration    This note is prepared by Phoebe Lowery, acting as Scribe for ZivityHUYEN. ZivityHUYEN: The scribe's documentation has been prepared under my direction and personally reviewed by me in its entirety. I confirm that the note above accurately reflects all work, treatment, procedures, and medical decision making performed by me. This note will not be viewable in [address]

## 2017-07-13 NOTE — ED NOTES
Thick scabbing in place over sutures to right foot. Sutures removed with minimal difficulty. No bleeding present. 4 sutures removed.

## 2017-07-13 NOTE — LETTER
[address] 
[facility] EMERGENCY DEPT 
[address] [place] [address] [address] [address] [phone] Work/School Note Date: 7/13/2017 To Whom It May concern: 
 
Denise Matos was seen and treated today in the emergency room by the following provider(s): 
Attending Provider: Dafne Camacho MD 
Physician Assistant: Angela Ho. Guillermo Oro. Tarah Miller may return to work on 07/15/2017. Sincerely, 
 
 
 
 
Angela Ho.  Guillermo Oro

## 2017-07-13 NOTE — DISCHARGE INSTRUCTIONS
Learning About Stitches and Staples Removal  When are stitches and staples removed? Your doctor will tell you when to have your stitches or staples removed, usually in 7 to 14 days. How long you'll be told to wait will depend on things like where the wound is located, how big and how deep the wound is, and what your general health is like. Do not remove the stitches on your own. Stitches on the face are usually removed within a week. But stitches and staples on other areas of the body, such as on the back or belly or over a joint, may need to stay in place longer, often a week or two. Be sure to follow your doctor's instructions. How are stitches and staples removed? It usually doesn't hurt when the doctor removes the stitches or staples. You may feel a tug as each stitch or staple is removed. · You will either be seated or lying down. · To remove stitches, the doctor will use scissors to cut each of the knots and then pull the threads out. · To remove staples, the doctor will use a tool to take out the staples one at a time. · The area may still feel tender after the stitches or staples are gone. But it should feel better within a few minutes or up to a few hours. What can you expect after stitches and staples are removed? Depending on the type and location of the cut, you will have a scar. Scars usually fade over time. Keep the area clean, but you won't need a bandage. When should you call for help? Call your doctor now or seek immediate medical care if:  · You have new pain, or your pain gets worse. · You have trouble moving the area near the scar. · You have symptoms of infection, such as:  ¨ Increased pain, swelling, warmth, or redness around the scar. ¨ Red streaks leading from the scar. ¨ Pus draining from the scar. ¨ A fever. Watch closely for changes in your health, and be sure to contact your doctor if:  · The scar opens. · You do not get better as expected.   Follow-up care is a key part of your treatment and safety. Be sure to make and go to all appointments, and call your doctor if you do not get better as expected. It's also a good idea to keep a list of the medicines you take. Where can you learn more? Go to http://deonte-leah.info/. Enter Y676 in the search box to learn more about \"Learning About Stitches and Staples Removal.\"  Current as of: March 20, 2017  Content Version: 11.3  © 8486-4649 [employer]. Care instructions adapted under license by [employer] (which disclaims liability or warranty for this information). If you have questions about a medical condition or this instruction, always ask your healthcare professional. [address] any warranty or liability for your use of this information.

## 2018-02-21 PROCEDURE — 96365 THER/PROPH/DIAG IV INF INIT: CPT

## 2018-02-21 PROCEDURE — 99284 EMERGENCY DEPT VISIT MOD MDM: CPT

## 2018-02-21 PROCEDURE — 96375 TX/PRO/DX INJ NEW DRUG ADDON: CPT

## 2018-02-22 ENCOUNTER — HOSPITAL ENCOUNTER (EMERGENCY)
Age: 33
Discharge: HOME | End: 2018-02-22
Attending: EMERGENCY MEDICINE | Admitting: EMERGENCY MEDICINE
Payer: SELF-PAY

## 2018-02-22 ENCOUNTER — APPOINTMENT (OUTPATIENT)
Dept: ULTRASOUND IMAGING | Age: 33
End: 2018-02-22
Attending: EMERGENCY MEDICINE
Payer: SELF-PAY

## 2018-02-22 VITALS
WEIGHT: 242.51 LBS | HEIGHT: 69 IN | TEMPERATURE: 98.2 F | DIASTOLIC BLOOD PRESSURE: 75 MMHG | RESPIRATION RATE: 15 BRPM | BODY MASS INDEX: 35.92 KG/M2 | HEART RATE: 64 BPM | OXYGEN SATURATION: 100 % | SYSTOLIC BLOOD PRESSURE: 117 MMHG

## 2018-02-22 DIAGNOSIS — N61.0 CELLULITIS OF LEFT BREAST: ICD-10-CM

## 2018-02-22 DIAGNOSIS — N61.1 BREAST ABSCESS OF FEMALE: Primary | ICD-10-CM

## 2018-02-22 LAB
ALBUMIN SERPL-MCNC: 3.8 G/DL (ref 3.5–5)
ALBUMIN/GLOB SERPL: 1.1 {RATIO} (ref 1.1–2.2)
ALP SERPL-CCNC: 55 U/L (ref 45–117)
ALT SERPL-CCNC: 32 U/L (ref 12–78)
ANION GAP SERPL CALC-SCNC: 7 MMOL/L (ref 5–15)
AST SERPL-CCNC: 19 U/L (ref 15–37)
ATRIAL RATE: 68 BPM
BASOPHILS # BLD: 0 K/UL (ref 0–0.1)
BASOPHILS NFR BLD: 1 % (ref 0–1)
BILIRUB SERPL-MCNC: 0.5 MG/DL (ref 0.2–1)
BUN SERPL-MCNC: 11 MG/DL (ref 6–20)
BUN/CREAT SERPL: 11 (ref 12–20)
CALCIUM SERPL-MCNC: 8.9 MG/DL (ref 8.5–10.1)
CALCULATED P AXIS, ECG09: 6 DEGREES
CALCULATED R AXIS, ECG10: 82 DEGREES
CALCULATED T AXIS, ECG11: 29 DEGREES
CHLORIDE SERPL-SCNC: 107 MMOL/L (ref 97–108)
CK MB CFR SERPL CALC: 0.7 % (ref 0–2.5)
CK MB SERPL-MCNC: 2 NG/ML (ref 5–25)
CK SERPL-CCNC: 287 U/L (ref 26–192)
CO2 SERPL-SCNC: 25 MMOL/L (ref 21–32)
CREAT SERPL-MCNC: 1 MG/DL (ref 0.55–1.02)
D DIMER PPP FEU-MCNC: 0.42 MG/L FEU (ref 0–0.65)
DIAGNOSIS, 93000: NORMAL
DIFFERENTIAL METHOD BLD: ABNORMAL
EOSINOPHIL # BLD: 0.2 K/UL (ref 0–0.4)
EOSINOPHIL NFR BLD: 3 % (ref 0–7)
ERYTHROCYTE [DISTWIDTH] IN BLOOD BY AUTOMATED COUNT: 11.9 % (ref 11.5–14.5)
GLOBULIN SER CALC-MCNC: 3.5 G/DL (ref 2–4)
GLUCOSE SERPL-MCNC: 86 MG/DL (ref 65–100)
HCT VFR BLD AUTO: 33.3 % (ref 35–47)
HGB BLD-MCNC: 11.1 G/DL (ref 11.5–16)
IMM GRANULOCYTES # BLD: 0 K/UL (ref 0–0.04)
IMM GRANULOCYTES NFR BLD AUTO: 0 % (ref 0–0.5)
LACTATE SERPL-SCNC: 0.3 MMOL/L (ref 0.4–2)
LYMPHOCYTES # BLD: 2.7 K/UL (ref 0.8–3.5)
LYMPHOCYTES NFR BLD: 41 % (ref 12–49)
MCH RBC QN AUTO: 30.9 PG (ref 26–34)
MCHC RBC AUTO-ENTMCNC: 33.3 G/DL (ref 30–36.5)
MCV RBC AUTO: 92.8 FL (ref 80–99)
MONOCYTES # BLD: 0.5 K/UL (ref 0–1)
MONOCYTES NFR BLD: 8 % (ref 5–13)
NEUTS SEG # BLD: 3.1 K/UL (ref 1.8–8)
NEUTS SEG NFR BLD: 48 % (ref 32–75)
NRBC # BLD: 0 K/UL (ref 0–0.01)
NRBC BLD-RTO: 0 PER 100 WBC
P-R INTERVAL, ECG05: 170 MS
PLATELET # BLD AUTO: 295 K/UL (ref 150–400)
PMV BLD AUTO: 10.2 FL (ref 8.9–12.9)
POTASSIUM SERPL-SCNC: 3.8 MMOL/L (ref 3.5–5.1)
PROT SERPL-MCNC: 7.3 G/DL (ref 6.4–8.2)
Q-T INTERVAL, ECG07: 398 MS
QRS DURATION, ECG06: 84 MS
QTC CALCULATION (BEZET), ECG08: 423 MS
RBC # BLD AUTO: 3.59 M/UL (ref 3.8–5.2)
SODIUM SERPL-SCNC: 139 MMOL/L (ref 136–145)
TROPONIN I SERPL-MCNC: <0.04 NG/ML
VENTRICULAR RATE, ECG03: 68 BPM
WBC # BLD AUTO: 6.5 K/UL (ref 3.6–11)

## 2018-02-22 PROCEDURE — 83605 ASSAY OF LACTIC ACID: CPT | Performed by: EMERGENCY MEDICINE

## 2018-02-22 PROCEDURE — 80053 COMPREHEN METABOLIC PANEL: CPT | Performed by: EMERGENCY MEDICINE

## 2018-02-22 PROCEDURE — 74011000258 HC RX REV CODE- 258: Performed by: EMERGENCY MEDICINE

## 2018-02-22 PROCEDURE — 76642 ULTRASOUND BREAST LIMITED: CPT

## 2018-02-22 PROCEDURE — 74011250636 HC RX REV CODE- 250/636: Performed by: EMERGENCY MEDICINE

## 2018-02-22 PROCEDURE — 93005 ELECTROCARDIOGRAM TRACING: CPT

## 2018-02-22 PROCEDURE — 82553 CREATINE MB FRACTION: CPT | Performed by: EMERGENCY MEDICINE

## 2018-02-22 PROCEDURE — 85379 FIBRIN DEGRADATION QUANT: CPT | Performed by: EMERGENCY MEDICINE

## 2018-02-22 PROCEDURE — 85025 COMPLETE CBC W/AUTO DIFF WBC: CPT | Performed by: EMERGENCY MEDICINE

## 2018-02-22 PROCEDURE — 36415 COLL VENOUS BLD VENIPUNCTURE: CPT | Performed by: EMERGENCY MEDICINE

## 2018-02-22 PROCEDURE — 84484 ASSAY OF TROPONIN QUANT: CPT | Performed by: EMERGENCY MEDICINE

## 2018-02-22 RX ORDER — KETOROLAC TROMETHAMINE 30 MG/ML
15 INJECTION, SOLUTION INTRAMUSCULAR; INTRAVENOUS
Status: COMPLETED | OUTPATIENT
Start: 2018-02-22 | End: 2018-02-22

## 2018-02-22 RX ORDER — SULFAMETHOXAZOLE AND TRIMETHOPRIM 800; 160 MG/1; MG/1
1 TABLET ORAL 2 TIMES DAILY
Qty: 14 TAB | Refills: 0 | Status: SHIPPED | OUTPATIENT
Start: 2018-02-22 | End: 2018-03-01

## 2018-02-22 RX ORDER — CEPHALEXIN 500 MG/1
500 CAPSULE ORAL 4 TIMES DAILY
Qty: 28 CAP | Refills: 0 | Status: SHIPPED | OUTPATIENT
Start: 2018-02-22 | End: 2018-02-22

## 2018-02-22 RX ORDER — OXYCODONE AND ACETAMINOPHEN 5; 325 MG/1; MG/1
1 TABLET ORAL
Qty: 10 TAB | Refills: 0 | Status: SHIPPED | OUTPATIENT
Start: 2018-02-22 | End: 2018-02-26 | Stop reason: SDUPTHER

## 2018-02-22 RX ORDER — CEPHALEXIN 500 MG/1
500 CAPSULE ORAL 4 TIMES DAILY
Qty: 28 CAP | Refills: 0 | Status: SHIPPED | OUTPATIENT
Start: 2018-02-22 | End: 2018-03-01

## 2018-02-22 RX ORDER — SULFAMETHOXAZOLE AND TRIMETHOPRIM 800; 160 MG/1; MG/1
1 TABLET ORAL 2 TIMES DAILY
Qty: 14 TAB | Refills: 0 | Status: SHIPPED | OUTPATIENT
Start: 2018-02-22 | End: 2018-02-22

## 2018-02-22 RX ADMIN — KETOROLAC TROMETHAMINE 15 MG: 30 INJECTION, SOLUTION INTRAMUSCULAR at 02:18

## 2018-02-22 RX ADMIN — AMPICILLIN SODIUM AND SULBACTAM SODIUM 3 G: 2; 1 INJECTION, POWDER, FOR SOLUTION INTRAMUSCULAR; INTRAVENOUS at 03:22

## 2018-02-22 NOTE — ED NOTES
Dr. Naga Stevens reviewed discharge instructions with the patient. The patient verbalized understanding. All questions and concerns were addressed. The patient declined a wheelchair and is discharged ambulatory in the care of family members with instructions and prescriptions in hand. Pt is alert and oriented x 4. Respirations are clear and unlabored.

## 2018-02-22 NOTE — ED NOTES
Pt presents ambulatory to ED with c/o left breast redness, pain, and swelling since Saturday. Pt reports hx of abscess in that breast in the past and needed surgery to drain it. Pt denies fevers or oozing from breast. A/Ox4. Call bell in reach. Pt In no apparent distress. Pt given warm blanket. Cardiac monitor x3. Will medicate once verified by pharmacy.

## 2018-02-22 NOTE — ED PROVIDER NOTES
EMERGENCY DEPARTMENT HISTORY AND PHYSICAL EXAM      Date: 2/22/2018  Patient Name: Tia Gleason    History of Presenting Illness     Chief Complaint   Patient presents with    Chest Pain    Breast pain       History Provided By: Patient    HPI: Tia Gleason is a 33 y.o. female who presents ambulatory to the ED c/o gradually worsening R breast pain, swelling, and redness x 1 week. Pt reports a hx of similar sxs with a previous abscess, that required I&D by a breast surgeon. She denies any follow up with the surgeon in ~2 years. Pt notes she does not currently have a PCP. She reports taking Aleve with no relief of sxs. Pt denies any modifying factors. She otherwise specifically denies any recent wound drainage, fevers, chills, nausea, vomiting, diarrhea, abd pain, CP, SOB, urinary sxs, changes in BM, or headache. PCP: None    Allergies: NKDA  Social Hx: -tobacco (former), +EtOH (socially), -Illicit Drugs    There are no other complaints, changes, or physical findings at this time. Current Facility-Administered Medications   Medication Dose Route Frequency Provider Last Rate Last Dose    ketorolac (TORADOL) injection 15 mg  15 mg IntraVENous NOW Aleksey Kelly MD        ampicillin-sulbactam (UNASYN) 3 g in 0.9% sodium chloride 100 mL IVPB  3 g IntraVENous ONCE Aleksey Kelly MD         Current Outpatient Prescriptions   Medication Sig Dispense Refill    oxyCODONE-acetaminophen (PERCOCET) 5-325 mg per tablet Take 1 Tab by mouth every four (4) hours as needed for Pain. Max Daily Amount: 6 Tabs. 20 Tab 0       Past History     Past Medical History:  No past medical history on file. Past Surgical History:  No past surgical history on file.     Family History:  Family History   Problem Relation Age of Onset    Hypertension Father     Diabetes Father     Hypertension Paternal Aunt     Diabetes Paternal Aunt     Diabetes Paternal Grandmother     Hypertension Paternal Grandmother        Social History:  Social History   Substance Use Topics    Smoking status: Former Smoker     Packs/day: 1.00    Smokeless tobacco: Never Used    Alcohol use Yes      Comment: socially       Allergies:  No Known Allergies      Review of Systems   Review of Systems   Constitutional: Negative. Negative for fever. Eyes: Negative. Respiratory: Negative. Negative for shortness of breath. Cardiovascular: Negative for chest pain. Gastrointestinal: Negative for abdominal pain, nausea and vomiting. Endocrine: Negative. Genitourinary: Negative. Negative for difficulty urinating, dysuria and hematuria. Musculoskeletal: Negative. Skin: Positive for color change (erythema to L breast) and rash. Allergic/Immunologic: Negative. Neurological: Negative. Psychiatric/Behavioral: Negative for suicidal ideas. All other systems reviewed and are negative. Physical Exam   Physical Exam   Constitutional: She is oriented to person, place, and time. She appears well-developed and well-nourished. No distress. HENT:   Head: Normocephalic and atraumatic. Nose: Nose normal.   Eyes: Conjunctivae and EOM are normal. No scleral icterus. Neck: Normal range of motion. No tracheal deviation present. Cardiovascular: Normal rate, regular rhythm, normal heart sounds and intact distal pulses. Exam reveals no friction rub. No murmur heard. Pulmonary/Chest: Effort normal and breath sounds normal. No stridor. No respiratory distress. She has no wheezes. She has no rales. She exhibits tenderness. Right breast exhibits no inverted nipple, no mass and no tenderness. Left breast exhibits skin change and tenderness. Left breast exhibits no inverted nipple, no mass and no nipple discharge. Breasts are asymmetrical (swelling to L breast). Abdominal: Soft. Bowel sounds are normal. She exhibits no distension. There is no tenderness. There is no rebound. Musculoskeletal: Normal range of motion.  She exhibits no tenderness. Neurological: She is alert and oriented to person, place, and time. No cranial nerve deficit. Skin: Skin is warm and dry. No rash noted. She is not diaphoretic. There is erythema. Psychiatric: She has a normal mood and affect. Her speech is normal and behavior is normal. Judgment and thought content normal. Cognition and memory are normal.   Nursing note and vitals reviewed. Diagnostic Study Results     Labs -     Recent Results (from the past 12 hour(s))   CBC WITH AUTOMATED DIFF    Collection Time: 02/22/18 12:13 AM   Result Value Ref Range    WBC 6.5 3.6 - 11.0 K/uL    RBC 3.59 (L) 3.80 - 5.20 M/uL    HGB 11.1 (L) 11.5 - 16.0 g/dL    HCT 33.3 (L) 35.0 - 47.0 %    MCV 92.8 80.0 - 99.0 FL    MCH 30.9 26.0 - 34.0 PG    MCHC 33.3 30.0 - 36.5 g/dL    RDW 11.9 11.5 - 14.5 %    PLATELET 358 557 - 349 K/uL    MPV 10.2 8.9 - 12.9 FL    NRBC 0.0 0  WBC    ABSOLUTE NRBC 0.00 0.00 - 0.01 K/uL    NEUTROPHILS 48 32 - 75 %    LYMPHOCYTES 41 12 - 49 %    MONOCYTES 8 5 - 13 %    EOSINOPHILS 3 0 - 7 %    BASOPHILS 1 0 - 1 %    IMMATURE GRANULOCYTES 0 0.0 - 0.5 %    ABS. NEUTROPHILS 3.1 1.8 - 8.0 K/UL    ABS. LYMPHOCYTES 2.7 0.8 - 3.5 K/UL    ABS. MONOCYTES 0.5 0.0 - 1.0 K/UL    ABS. EOSINOPHILS 0.2 0.0 - 0.4 K/UL    ABS. BASOPHILS 0.0 0.0 - 0.1 K/UL    ABS. IMM. GRANS. 0.0 0.00 - 0.04 K/UL    DF AUTOMATED     METABOLIC PANEL, COMPREHENSIVE    Collection Time: 02/22/18 12:13 AM   Result Value Ref Range    Sodium 139 136 - 145 mmol/L    Potassium 3.8 3.5 - 5.1 mmol/L    Chloride 107 97 - 108 mmol/L    CO2 25 21 - 32 mmol/L    Anion gap 7 5 - 15 mmol/L    Glucose 86 65 - 100 mg/dL    BUN 11 6 - 20 MG/DL    Creatinine 1.00 0.55 - 1.02 MG/DL    BUN/Creatinine ratio 11 (L) 12 - 20      GFR est AA >60 >60 ml/min/1.73m2    GFR est non-AA >60 >60 ml/min/1.73m2    Calcium 8.9 8.5 - 10.1 MG/DL    Bilirubin, total 0.5 0.2 - 1.0 MG/DL    ALT (SGPT) 32 12 - 78 U/L    AST (SGOT) 19 15 - 37 U/L    Alk. phosphatase 55 45 - 117 U/L    Protein, total 7.3 6.4 - 8.2 g/dL    Albumin 3.8 3.5 - 5.0 g/dL    Globulin 3.5 2.0 - 4.0 g/dL    A-G Ratio 1.1 1.1 - 2.2     TROPONIN I    Collection Time: 02/22/18 12:13 AM   Result Value Ref Range    Troponin-I, Qt. <0.04 <0.05 ng/mL   CK W/ CKMB & INDEX    Collection Time: 02/22/18 12:13 AM   Result Value Ref Range     (H) 26 - 192 U/L    CK - MB 2.0 <3.6 NG/ML    CK-MB Index 0.7 0 - 2.5         Radiologic Studies -   US BREAST LT LIMITED=<3 QUAD         INDICATION:   Palp mass, 30-40yo, initial eval; eval for abscess     COMPARISON:  None.     FINDINGS:  Breast sonography was performed of the region of clinical concern in  the left breast. There is a 1.7 x 2.2 x 2.2 cm minimally hypoechoic avascular  area corresponding to site of clinical concern at about the 6-9:00 position. The exam demonstrates no evidence for discrete cystic or solid mass.     IMPRESSION  IMPRESSION: 1.7 x 2.2 x 2.2 cm complex collection which may reflect abscess at  site of clinical concern. Medical Decision Making   I am the first provider for this patient. I reviewed the vital signs, available nursing notes, past medical history, past surgical history, family history and social history. Vital Signs-Reviewed the patient's vital signs. Patient Vitals for the past 12 hrs:   Temp Pulse Resp BP SpO2   02/22/18 0006 98.2 °F (36.8 °C) 70 16 128/80 100 %       Records Reviewed: Nursing Notes and Old Medical Records    Provider Notes (Medical Decision Making):     DDX:  Breast abscess, cellulitis, acs, pe    Plan:  Labs, d dimer, lactate, US, analgesic    Impression:  Breast cellulitis and abscess    ED Course:   Initial assessment performed. The patients presenting problems have been discussed, and they are in agreement with the care plan formulated and outlined with them. I have encouraged them to ask questions as they arise throughout their visit.     I reviewed our electronic medical record system for any past medical records that were available that may contribute to the patients current condition, the nursing notes and and vital signs from today's visit    Nursing notes will be reviewed as they become available in realtime while the pt has been in the ED. Gabriella Turk MD    EKG interpretation 0001: NSR, nl Axis, rate 68; , QRS 84, QTc 423; S1Q3T3 pattern, no acute ischemia; Gabriella Turk MD    PROGRESS NOTE:  3:47 AM  Pt reevaluated. Pt informed of US findings and plan for dc home with abx, analgesics and need to call breast surgeon in the AM to schedule a prompt f/u appointment to have abscess drained. Pt will follow up as instructed. All questions have been answered, pt voiced understanding and agreement with plan. If narcotics were prescribed, pt was advised not to drive or operate heavy machinery. If abx were prescribed, pt advised that diarrhea and rash are possible side effects of the medications. Specific return precautions provided in addition to instructions for pt to return to the ED immediately should sx worsen at any time. Gabriella Turk MD    Diagnosis     Clinical Impression:   1. Breast abscess of female    2. Cellulitis of left breast        PLAN:  1. Current Discharge Medication List      START taking these medications    Details   trimethoprim-sulfamethoxazole (BACTRIM DS) 160-800 mg per tablet Take 1 Tab by mouth two (2) times a day for 7 days. Qty: 14 Tab, Refills: 0      cephALEXin (KEFLEX) 500 mg capsule Take 1 Cap by mouth four (4) times daily for 7 days. Qty: 28 Cap, Refills: 0      !! oxyCODONE-acetaminophen (PERCOCET) 5-325 mg per tablet Take 1 Tab by mouth every four (4) hours as needed for Pain. Max Daily Amount: 6 Tabs. Qty: 10 Tab, Refills: 0    Associated Diagnoses: Breast abscess of female; Cellulitis of left breast       !! - Potential duplicate medications found. Please discuss with provider.       CONTINUE these medications which have NOT CHANGED    Details   !! oxyCODONE-acetaminophen (PERCOCET) 5-325 mg per tablet Take 1 Tab by mouth every four (4) hours as needed for Pain. Max Daily Amount: 6 Tabs. Qty: 20 Tab, Refills: 0       !! - Potential duplicate medications found. Please discuss with provider. 2.   Follow-up Information     Follow up With Details Comments Shin Zhang MD Call today  [address]  [address] [phone]          Return to ED if worse     Disposition:    DISCHARGE NOTE:  3:48 AM  The patient's results have been reviewed with family and/or caregiver. They verbally convey their understanding and agreement of the patient's signs, symptoms, diagnosis, treatment, and prognosis. They additionally agree to follow up as recommended in the discharge instructions or to return to the Emergency Room should the patient's condition change prior to their follow-up appointment. The family and/or caregiver verbally agrees with the care-plan and all of their questions have been answered. The discharge instructions have also been provided to the them along with educational information regarding the patient's diagnosis and a list of reasons why the patient would want to return to the ER prior to their follow-up appointment should their condition change. Written by Kristina Andres, ED Scribe, as dictated by Emmanuel Myers MD.             Attestations: This note is prepared by Kristina Andres, acting as Scribe for MD Emmanuel Paz MD : The scribe's documentation has been prepared under my direction and personally reviewed by me in its entirety. I confirm that the note above accurately reflects all work, treatment, procedures, and medical decision making performed by me. This note will not be viewable in [address]

## 2018-02-22 NOTE — DISCHARGE INSTRUCTIONS
Mastitis: Care Instructions  Your Care Instructions  Mastitis is an inflammation of the breast. It occurs most often in women who are breastfeeding, but it can affect any woman. Mastitis can be caused by poor milk flow from the breast. When milk builds up in a breast, it leaks into the nearby breast tissue. Infection can also develop when the nipples become cracked or irritated. The tissue can then become infected with bacteria. Antibiotics can usually cure mastitis. For women who are nursing, continued breastfeeding (or pumping) can help. If mastitis is not treated, a pocket of pus may form in the breast and need to be drained. Follow-up care is a key part of your treatment and safety. Be sure to make and go to all appointments, and call your doctor if you are having problems. It's also a good idea to know your test results and keep a list of the medicines you take. How can you care for yourself at home? · If your doctor prescribed antibiotics, take them as directed. Do not stop taking them just because you feel better. You need to take the full course of antibiotics. · If you are breastfeeding, continue breastfeeding or pumping breast milk. It is important to empty your breasts regularly, every 2 to 3 hours while you are awake. These tips may help:  ¨ Before breastfeeding, place a warm, wet washcloth over your breast for about 15 minutes. Try this at least 3 times a day. This increases milk flow in the breast. Massaging the affected breast may also increase milk flow. ¨ Breastfeed on both sides. Try to start with your healthy breast first. Then, after your milk is flowing, breastfeed from the affected breast until it feels soft. You should empty this breast completely. Then switch back to the healthy breast and breastfeed until your baby has finished. ¨ Pump or hand-express a small amount of breast milk before breastfeeding if your breasts are too full with milk.  This will make your breasts less full and may make it easier for your baby to latch on to your breast.  ¨ Pump or express milk from the affected breast if it hurts too much to breastfeed. · Take an over-the-counter pain medicine, such as acetaminophen (Tylenol) or ibuprofen (Advil, Motrin) to relieve pain and fever. Read and follow all instructions on the label. · Do not take two or more pain medicines at the same time unless the doctor told you to. Many pain medicines have acetaminophen, which is Tylenol. Too much acetaminophen (Tylenol) can be harmful. · Rest as much as possible. · Drink extra fluids. · If pus is draining from your infected breast, wash the nipple gently and let it air-dry before you put your bra back on. A disposable breast pad placed in the bra cup may absorb the pus. When should you call for help? Call your doctor now or seek immediate medical care if:  ? · You have worse symptoms of a breast infection, such as:  ¨ Increased pain, swelling, redness, or warmth around a breast.  ¨ Red streaks leading from a breast.  ¨ Pus draining from a breast.  ¨ A fever. ? Watch closely for changes in your health, and be sure to contact your doctor if:  ? · You do not get better as expected. Where can you learn more? Go to http://deonte-leah.info/. Enter Y207 in the search box to learn more about \"Mastitis: Care Instructions. \"  Current as of: March 16, 2017  Content Version: 11.4  © 7906-9882 [employer]. Care instructions adapted under license by [employer] (which disclaims liability or warranty for this information). If you have questions about a medical condition or this instruction, always ask your healthcare professional. [place] [address] any warranty or liability for your use of this information.

## 2018-02-22 NOTE — LETTER
[address] 
[facility] EMERGENCY DEPT 
[address] [place] [address] [id number] [phone] Work/School Note Date: 2/21/2018 To Whom It May concern: 
 
Neris Biggs was seen and treated today in the emergency room by the following provider(s): 
Attending Provider: Sedrick Perry MD.   
 
Neris Biggs may return to work on 2/23/18.  
 
Sincerely, 
 
 
 
 
Sedrick Perry MD

## 2018-02-26 ENCOUNTER — DOCUMENTATION ONLY (OUTPATIENT)
Dept: SURGERY | Age: 33
End: 2018-02-26

## 2018-02-26 ENCOUNTER — OFFICE VISIT (OUTPATIENT)
Dept: SURGERY | Age: 33
End: 2018-02-26

## 2018-02-26 VITALS
BODY MASS INDEX: 35.84 KG/M2 | HEIGHT: 69 IN | SYSTOLIC BLOOD PRESSURE: 131 MMHG | WEIGHT: 242 LBS | DIASTOLIC BLOOD PRESSURE: 79 MMHG | HEART RATE: 83 BPM

## 2018-02-26 DIAGNOSIS — N61.1 LEFT BREAST ABSCESS: Primary | ICD-10-CM

## 2018-02-26 NOTE — LETTER
NOTIFICATION RETURN TO WORK / SCHOOL 
 
2/26/2018 2:56 PM 
 
[address] [phone] To Whom It May Concern: 
 
Reza Lehman is currently under the care of [address] [place] [address]. Please allow patient to be out of work from Monday, February 26, 2018, through Thursday, March 1, 2018, to a procedure that she had done in our office. If there are questions or concerns please have the patient contact our office. Sincerely, Keon Mccartney MD

## 2018-02-26 NOTE — MR AVS SNAPSHOT
[address] [address] [address] [facility] 
[phone] Patient: Gail Valentino 
MRN: [id number] :1985 Visit Information Date & Time Provider Department Dept. Phone Encounter #  
 2018  2:20 PM Josemanuel Bruner MD [address] at [address] [place] [address] [id number] Upcoming Health Maintenance Date Due DTaP/Tdap/Td series (1 - Tdap) 10/15/2007 Influenza Age 5 to Adult 2017 PAP AKA CERVICAL CYTOLOGY 2019 Allergies as of 2018  Review Complete On: 2018 By: Yuliet Guillen RN No Known Allergies Current Immunizations  Never Reviewed No immunizations on file. Not reviewed this visit Vitals BP Pulse Height(growth percentile) Weight(growth percentile) BMI OB Status 131/79 83 5' 9\" (1.753 m) 242 lb (109.8 kg) 35.74 kg/m2 Having regular periods Smoking Status Former Smoker Vitals History BMI and BSA Data Body Mass Index Body Surface Area 35.74 kg/m 2 2.31 m 2 Preferred Pharmacy Pharm[facility], [address] [place] [address] Your Updated Medication List  
  
   
This list is accurate as of 18  2:54 PM.  Always use your most recent med list.  
  
  
  
  
 cephALEXin 500 mg capsule Commonly known as:  Ilene Desanctis Take 1 Cap by mouth four (4) times daily for 7 days. oxyCODONE-acetaminophen 5-325 mg per tablet Commonly known as:  PERCOCET Take 1 Tab by mouth every four (4) hours as needed for Pain. Max Daily Amount: 6 Tabs. trimethoprim-sulfamethoxazole 160-800 mg per tablet Commonly known as:  BACTRIM DS Take 1 Tab by mouth two (2) times a day for 7 days. Patient Instructions Breast Abscess: Care Instructions Your Care Instructions An abscess is a pocket of pus formed by infection.  Breast abscesses are most common in women who are breastfeeding. You can usually continue to breastfeed your baby in spite of a breast abscess. It will not harm your baby. If your doctor advises you to stop breastfeeding on the affected breast while it heals, you can continue breastfeeding from the healthy breast. 
Sometimes antibiotics are used to treat a breast abscess. If antibiotics do not cure the abscess, it may need to be drained through a small cut (incision). You may have had a sedative to help you relax. You may be unsteady after having sedation. It can take a few hours for the medicine's effects to wear off. Common side effects of sedation include nausea, vomiting, and feeling sleepy or tired. The doctor has checked you carefully, but problems can develop later. If you notice any problems or new symptoms, get medical treatment right away. Follow-up care is a key part of your treatment and safety. Be sure to make and go to all appointments, and call your doctor if you are having problems. It's also a good idea to know your test results and keep a list of the medicines you take. How can you care for yourself at home? · If the doctor gave you a sedative: ¨ For 24 hours, don't do anything that requires attention to detail. It takes time for the medicine's effects to completely wear off. ¨ For your safety, do not drive or operate any machinery that could be dangerous. Wait until the medicine wears off and you can think clearly and react easily. · If your doctor prescribed antibiotics, take them as directed. Do not stop taking them just because you feel better. You need to take the full course of antibiotics. · If your doctor drained the abscess, you may have a tube or gauze in the abscess to allow it to continue draining. Follow your doctor's instructions on bathing and caring for the wound.  
· If you are breastfeeding, continue breastfeeding or pumping breast milk, as your doctor advises. It is important to empty your breasts regularly. However, your doctor may advise you to discard the milk from the affected breast until the abscess heals. ¨ Before breastfeeding, place a warm, wet washcloth over the breast for about 15 minutes. Try this at least 3 times a day. ¨ If pus is no longer draining from the abscess, breastfeed on both sides. ¨ Gently massage your breast to stimulate milk flow. ¨ Pump or hand-express a small amount of breast milk before breastfeeding if your breasts are too full with milk or if it hurts too much to nurse. This will make your breasts less full and may make it easier for your baby to nurse. ¨ Try feeding from the healthy breast. Then, after your milk is flowing, breastfeed from the affected breast until it feels soft. · Be safe with medicines. Take pain medicines exactly as directed. ¨ If your doctor gave you a prescription medicine for pain, take it as prescribed. ¨ If you are not taking a prescription pain medicine, ask your doctor if you can take an over-the-counter medicine. ¨ Do not take two or more pain medicines at the same time unless your doctor told you to. Many pain medicines have acetaminophen, which is Tylenol. Too much acetaminophen (Tylenol) can be harmful. · Put ice or a cold pack on your breast for 10 to 15 minutes at a time to reduce pain and swelling. If you are breastfeeding, do this between feedings. Put a thin cloth between the ice and your skin. · If pus is draining from your infected breast, wash the nipple gently and let it air-dry before you put your bra back on. A disposable breast pad placed in the bra cup will help absorb the pus. When should you call for help? Call 911 anytime you think you may need emergency care. For example, call if: 
? · You have trouble breathing. ? · You passed out (lost consciousness). ?Call your doctor now or seek immediate medical care if: ? · You have new or worse nausea or vomiting. ? · Your symptoms of infection get worse. This may include: 
¨ Increased pain, swelling, redness, or warmth around a breast. 
¨ Red streaks extending from the breast. 
¨ Pus draining from a breast. 
¨ A new or higher fever. ? Watch closely for changes in your health, and be sure to contact your doctor if: 
? · You do not get better as expected. Where can you learn more? Go to http://deonte-leah.info/. Enter E306 in the search box to learn more about \"Breast Abscess: Care Instructions. \" Current as of: October 13, 2016 Content Version: 11.4 © 0581-7517 Framed Data. Care instructions adapted under license by Mobile Experience (which disclaims liability or warranty for this information). If you have questions about a medical condition or this instruction, always ask your healthcare professional. [address] any warranty or liability for your use of this information. Introducing [facility] & HEALTH SERVICES! Carmelita Fothergill introduces Bag Borrow or Steal patient portal. Now you can access parts of your medical record, email your doctor's office, and request medication refills online. 1. In your internet browser, go to [email or web address] 2. Click on the First Time User? Click Here link in the Sign In box. You will see the New Member Sign Up page. 3. Enter your Bag Borrow or Steal Access Code exactly as it appears below. You will not need to use this code after youve completed the sign-up process. If you do not sign up before the expiration date, you must request a new code. · ACACIA Semiconductorhart Access Code: [facility] Expires: 5/23/2018  3:44 AM 
 
4. Enter the last four digits of your Social Security Number (xxxx) and Date of Birth (mm/dd/yyyy) as indicated and click Submit. You will be taken to the next sign-up page. 5. Create a Verdex Technologiest ID.  This will be your Bag Borrow or Steal login ID and cannot be changed, so think of one that is secure and easy to remember. 6. Create a [facility] password. You can change your password at any time. 7. Enter your Password Reset Question and Answer. This can be used at a later time if you forget your password. 8. Enter your e-mail address. You will receive e-mail notification when new information is available in [address] 9. Click Sign Up. You can now view and download portions of your medical record. 10. Click the Download Summary menu link to download a portable copy of your medical information. If you have questions, please visit the Frequently Asked Questions section of the [facility] website. Remember, [facility] is NOT to be used for urgent needs. For medical emergencies, dial 911. Now available from your iPhone and Android! Please provide this summary of care documentation to your next provider. Your primary care clinician is listed as NONE. If you have any questions after today's visit, please call [phone].

## 2018-02-26 NOTE — PATIENT INSTRUCTIONS
Breast Abscess: Care Instructions  Your Care Instructions  An abscess is a pocket of pus formed by infection. Breast abscesses are most common in women who are breastfeeding. You can usually continue to breastfeed your baby in spite of a breast abscess. It will not harm your baby. If your doctor advises you to stop breastfeeding on the affected breast while it heals, you can continue breastfeeding from the healthy breast.  Sometimes antibiotics are used to treat a breast abscess. If antibiotics do not cure the abscess, it may need to be drained through a small cut (incision). You may have had a sedative to help you relax. You may be unsteady after having sedation. It can take a few hours for the medicine's effects to wear off. Common side effects of sedation include nausea, vomiting, and feeling sleepy or tired. The doctor has checked you carefully, but problems can develop later. If you notice any problems or new symptoms, get medical treatment right away. Follow-up care is a key part of your treatment and safety. Be sure to make and go to all appointments, and call your doctor if you are having problems. It's also a good idea to know your test results and keep a list of the medicines you take. How can you care for yourself at home? · If the doctor gave you a sedative:  ¨ For 24 hours, don't do anything that requires attention to detail. It takes time for the medicine's effects to completely wear off. ¨ For your safety, do not drive or operate any machinery that could be dangerous. Wait until the medicine wears off and you can think clearly and react easily. · If your doctor prescribed antibiotics, take them as directed. Do not stop taking them just because you feel better. You need to take the full course of antibiotics. · If your doctor drained the abscess, you may have a tube or gauze in the abscess to allow it to continue draining.  Follow your doctor's instructions on bathing and caring for the wound.  · If you are breastfeeding, continue breastfeeding or pumping breast milk, as your doctor advises. It is important to empty your breasts regularly. However, your doctor may advise you to discard the milk from the affected breast until the abscess heals. ¨ Before breastfeeding, place a warm, wet washcloth over the breast for about 15 minutes. Try this at least 3 times a day. ¨ If pus is no longer draining from the abscess, breastfeed on both sides. ¨ Gently massage your breast to stimulate milk flow. ¨ Pump or hand-express a small amount of breast milk before breastfeeding if your breasts are too full with milk or if it hurts too much to nurse. This will make your breasts less full and may make it easier for your baby to nurse. ¨ Try feeding from the healthy breast. Then, after your milk is flowing, breastfeed from the affected breast until it feels soft. · Be safe with medicines. Take pain medicines exactly as directed. ¨ If your doctor gave you a prescription medicine for pain, take it as prescribed. ¨ If you are not taking a prescription pain medicine, ask your doctor if you can take an over-the-counter medicine. ¨ Do not take two or more pain medicines at the same time unless your doctor told you to. Many pain medicines have acetaminophen, which is Tylenol. Too much acetaminophen (Tylenol) can be harmful. · Put ice or a cold pack on your breast for 10 to 15 minutes at a time to reduce pain and swelling. If you are breastfeeding, do this between feedings. Put a thin cloth between the ice and your skin. · If pus is draining from your infected breast, wash the nipple gently and let it air-dry before you put your bra back on. A disposable breast pad placed in the bra cup will help absorb the pus. When should you call for help? Call 911 anytime you think you may need emergency care. For example, call if:  ? · You have trouble breathing. ? · You passed out (lost consciousness).    ?Call your doctor now or seek immediate medical care if:  ? · You have new or worse nausea or vomiting. ? · Your symptoms of infection get worse. This may include:  ¨ Increased pain, swelling, redness, or warmth around a breast.  ¨ Red streaks extending from the breast.  ¨ Pus draining from a breast.  ¨ A new or higher fever. ? Watch closely for changes in your health, and be sure to contact your doctor if:  ? · You do not get better as expected. Where can you learn more? Go to http://deonte-leah.info/. Enter B516 in the search box to learn more about \"Breast Abscess: Care Instructions. \"  Current as of: October 13, 2016  Content Version: 11.4  © 7895-4752 SmartyContent. Care instructions adapted under license by Flaconi (which disclaims liability or warranty for this information). If you have questions about a medical condition or this instruction, always ask your healthcare professional. [address] [address] any warranty or liability for your use of this information.

## 2018-02-26 NOTE — PROGRESS NOTES
HISTORY OF PRESENT ILLNESS  Tarah Weinberg is a 33 y.o. female. HPI NEW Patient presents for consultation at the [facility] ED for LEFT breast abscess. The patient started having pain and redness in LEFT breast for about 5 days, then went to the ED on 2/22/18. She had a breast ultrasound and a fluid collection was seen. The patient was prescribed bactrim and keflex and percocet. She does not like taking the percocet because it makes her sleepy. She states that the abscess has gotten worse, more red and painful, and her skin is starting to peel. She says that it is about to \"burst open. \" She had had this issue in the past in the same exact location and had a procedure done for it in 2014. However, she says that it is worse now than it was in 2014. FH: Maternal grandmother had breast cancer, unsure what age. The patient had a LEFT breast US done 2/22/18:  Study Result   INDICATION:   Palp mass, 30-40yo, initial eval; eval for abscess     COMPARISON:  None.     FINDINGS:  Breast sonography was performed of the region of clinical concern in  the left breast. There is a 1.7 x 2.2 x 2.2 cm minimally hypoechoic avascular  area corresponding to site of clinical concern at about the 6-9:00 position. The exam demonstrates no evidence for discrete cystic or solid mass.     IMPRESSION  IMPRESSION: 1.7 x 2.2 x 2.2 cm complex collection which may reflect abscess at  site of clinical concern. History reviewed. No pertinent past medical history. Past Surgical History:   Procedure Laterality Date    BREAST SURGERY PROCEDURE UNLISTED Left 2014     Social History     Social History    Marital status: SINGLE     Spouse name: N/A    Number of children: N/A    Years of education: N/A     Occupational History    Not on file.      Social History Main Topics    Smoking status: Former Smoker     Packs/day: 1.00    Smokeless tobacco: Never Used    Alcohol use 0.6 oz/week     1 Standard drinks or equivalent per week Comment: socially    Drug use: No    Sexual activity: No     Other Topics Concern    Not on file     Social History Narrative     OB History     Obstetric Comments    Menarche:  14. LMP: ? .  # of Children:  0. Age at Delivery of First Child:  n/a. Hysterectomy/oophorectomy:  NO/NO. Breast Bx:  no.  Hx of Breast Feeding:  no. BCP:  no. Hormone therapy:  no.             Current Outpatient Prescriptions:     trimethoprim-sulfamethoxazole (BACTRIM DS) 160-800 mg per tablet, Take 1 Tab by mouth two (2) times a day for 7 days. , Disp: 14 Tab, Rfl: 0    cephALEXin (KEFLEX) 500 mg capsule, Take 1 Cap by mouth four (4) times daily for 7 days. , Disp: 28 Cap, Rfl: 0    oxyCODONE-acetaminophen (PERCOCET) 5-325 mg per tablet, Take 1 Tab by mouth every four (4) hours as needed for Pain. Max Daily Amount: 6 Tabs., Disp: 20 Tab, Rfl: 0  No Known Allergies    Review of Systems   Constitutional: Positive for malaise/fatigue. Night sweats   HENT: Negative. Eyes: Negative. Respiratory: Negative. Cardiovascular: Positive for chest pain. Gastrointestinal: Negative. Genitourinary: Negative. Musculoskeletal: Negative. Skin: Negative. Neurological: Positive for focal weakness. Endo/Heme/Allergies: Bruises/bleeds easily. Psychiatric/Behavioral: The patient is nervous/anxious. Physical Exam   Constitutional: She is oriented to person, place, and time. She appears well-developed and well-nourished. HENT:   Head: Normocephalic. Eyes: EOM are normal.   Neck: Neck supple. Cardiovascular: Intact distal pulses. Pulmonary/Chest: Effort normal. Right breast exhibits no inverted nipple, no mass, no nipple discharge, no skin change and no tenderness. Left breast exhibits skin change (eryhtmea and surrounding induration with central fluctuance). Abdominal: Soft. Musculoskeletal: Normal range of motion. Neurological: She is alert and oriented to person, place, and time.    Skin: Skin is warm and dry. Psychiatric: She has a normal mood and affect. Nursing note and vitals reviewed. ASPIRATION OF Abscess  Indication : left breast abscess  Prep : Alcohol. Guidance : Ultrasound guidance. Yield :  5 cc of serous fluid was aspirated with an 18 gauge needle. Effect : abscess completely aspirated. Disposition:  Follow up in one week if swelling recurs. Continue abx. Culture sent      ASSESSMENT and PLAN    ICD-10-CM ICD-9-CM    1. Left breast abscess N61.1 611.0 AEROBIC BACTERIAL CULTURE     - s/p aspiration  - f/u 1 week  - continue abx. Culture sent.

## 2018-03-01 LAB — BACTERIA SPEC AEROBE CULT: NORMAL

## 2018-03-05 ENCOUNTER — OFFICE VISIT (OUTPATIENT)
Dept: SURGERY | Age: 33
End: 2018-03-05

## 2018-03-05 VITALS
HEART RATE: 62 BPM | SYSTOLIC BLOOD PRESSURE: 145 MMHG | DIASTOLIC BLOOD PRESSURE: 73 MMHG | WEIGHT: 242 LBS | BODY MASS INDEX: 35.84 KG/M2 | HEIGHT: 69 IN

## 2018-03-05 DIAGNOSIS — N61.1 LEFT BREAST ABSCESS: Primary | ICD-10-CM

## 2018-03-05 NOTE — PROGRESS NOTES
HISTORY OF PRESENT ILLNESS  Tarah Montero is a 33 y.o. female. HPI   ESTABLISHED patient here today for follow up LEFT breast abscess. She finished her antibiotics (bactrim and keflex) on 18. She reports area has improved. Area is still red and having some drainage. Still feels hard lump. Denies any new breast problems at this time. LEFT breast abscess culture from 18-   Aerobic culture   Mixed skin michael   Scant growth      FH: Maternal grandmother had breast cancer, unsure what age. Review of Systems   All other systems reviewed and are negative. Physical Exam   Pulmonary/Chest: Left breast exhibits skin change (erythema skin peeling). Central fluctuance with induration left breast 5:00   Nursing note and vitals reviewed. Incision and Drainage  Indication : Left breast abscess,5:00  Prep : alcohol. Anesthesia : Lidocaine 1% with epi, 5 cc   Yield :3 cc purulent fluid drained  Packin/4\" nugauze, approximately 2 cm  Diposition: Okay to shower. Change dressing as needed. Pt instructed how to replace packing daily. Follow-up 1 week.     [facility] [place] MAIN OFFICE [address] [facility]  OFFICE PROCEDURE PROGRESS NOTE        Chart reviewed for the following:   Ivan Beth MD, have reviewed the History, Physical and updated the Allergic reactions for Tarah Miller     TIME OUT performed immediately prior to start of procedure:   Ivan Beth MD, have performed the following reviews on [address] prior to the start of the procedure:            * Patient was identified by name and date of birth   * Agreement on procedure being performed was verified  * Risks and Benefits explained to the patient  * Procedure site verified and marked as necessary  * Patient was positioned for comfort  * Consent was signed and verified     Time: 1430       Date of procedure: 3/6/2018    Procedure performed by:  Keshav Briceño MD    Provider assisted by: ANDREI RN    Patient assisted by: self    How tolerated by patient: tolerated the procedure well with no complications    Post Procedural Pain Scale: 0 - No Hurt    Comments: none          ASSESSMENT and PLAN    ICD-10-CM ICD-9-CM    1. Left breast abscess N61.1 611.0      - s/p I and D abscess. Will prescribe second course abx.  F/u 1 week

## 2018-03-05 NOTE — MR AVS SNAPSHOT
[address] [id number] [address] [place] [address] 
[phone] Patient: Manda Leiva 
MRN: [id number] :1985 Visit Information Date & Time Provider Department Dept. Phone Encounter #  
 3/5/2018  2:20 PM MD [address] at [facility] [id number] Your Appointments 3/12/2018 10:20 AM  
Follow Up with MD [address] at [facility]) Appt Note: 1week f/u per dr. Benita Wells [address] [facility] [address]  
  
   
 [address] Upcoming Health Maintenance Date Due DTaP/Tdap/Td series (1 - Tdap) 10/15/2007 Influenza Age 5 to Adult 2017 PAP AKA CERVICAL CYTOLOGY 2019 Allergies as of 3/5/2018  Review Complete On: 2018 By: Serena Hebert MD  
 No Known Allergies Current Immunizations  Never Reviewed No immunizations on file. Not reviewed this visit Vitals OB Status Smoking Status Having regular periods Former Smoker [facility]erred [facility]acy [facility] Name Phone [facility] [address] [address] Your Updated Medication List  
  
   
This list is accurate as of 3/5/18  2:55 PM.  Always use your most recent med list.  
  
  
  
  
 oxyCODONE-acetaminophen 5-325 mg per tablet Commonly known as:  PERCOCET Take 1 Tab by mouth every four (4) hours as needed for Pain. Max Daily Amount: 6 Tabs. Introducing [facility] & HEALTH SERVICES! [facility] introduces Pandora Media patient portal. Now you can access parts of your medical record, email your doctor's office, and request medication refills online. 1. In your internet browser, go to [email or web address] 2. Click on the First Time User? Click Here link in the Sign In box.  You will see the New Member Sign Up page. 3. Enter your Jans Digital Planst Access Code exactly as it appears below. You will not need to use this code after youve completed the sign-up process. If you do not sign up before the expiration date, you must request a new code. · Vigster Access Code: [facility] Expires: 5/23/2018  3:44 AM 
 
4. Enter the last four digits of your Social Security Number (xxxx) and Date of Birth (mm/dd/yyyy) as indicated and click Submit. You will be taken to the next sign-up page. 5. Create a Jans Digital Planst ID. This will be your Vigster login ID and cannot be changed, so think of one that is secure and easy to remember. 6. Create a Vigster password. You can change your password at any time. 7. Enter your Password Reset Question and Answer. This can be used at a later time if you forget your password. 8. Enter your e-mail address. You will receive e-mail notification when new information is available in [address] 9. Click Sign Up. You can now view and download portions of your medical record. 10. Click the Download Summary menu link to download a portable copy of your medical information. If you have questions, please visit the Frequently Asked Questions section of the Vigster website. Remember, Vigster is NOT to be used for urgent needs. For medical emergencies, dial 911. Now available from your iPhone and Android! Please provide this summary of care documentation to your next provider. Your primary care clinician is listed as NONE. If you have any questions after today's visit, please call [phone].

## 2018-03-05 NOTE — PATIENT INSTRUCTIONS
Breast Self-Exam: Care Instructions  Your Care Instructions    A breast self-exam is when you check your breasts for lumps or changes. This regular exam helps you learn how your breasts normally look and feel. Most breast problems or changes are not because of cancer. Breast self-exam is not a substitute for a mammogram. Having regular breast exams by your doctor and regular mammograms improve your chances of finding any problems with your breasts. Some women set a time each month to do a step-by-step breast self-exam. Other women like a less formal system. They might look at their breasts as they brush their teeth, or feel their breasts once in a while in the shower. If you notice a change in your breast, tell your doctor. Follow-up care is a key part of your treatment and safety. Be sure to make and go to all appointments, and call your doctor if you are having problems. It's also a good idea to know your test results and keep a list of the medicines you take. How do you do a breast self-exam?  · The best time to examine your breasts is usually one week after your menstrual period begins. Your breasts should not be tender then. If you do not have periods, you might do your exam on a day of the month that is easy to remember. · To examine your breasts:  ¨ Remove all your clothes above the waist and lie down. When you are lying down, your breast tissue spreads evenly over your chest wall, which makes it easier to feel all your breast tissue. ¨ Use the pads-not the fingertips-of the 3 middle fingers of your left hand to check your right breast. Move your fingers slowly in small coin-sized circles that overlap. ¨ Use three levels of pressure to feel of all your breast tissue. Use light pressure to feel the tissue close to the skin surface. Use medium pressure to feel a little deeper. Use firm pressure to feel your tissue close to your breastbone and ribs.  Use each pressure level to feel your breast tissue before moving on to the next spot. ¨ Check your entire breast, moving up and down as if following a strip from the collarbone to the bra line, and from the armpit to the ribs. Repeat until you have covered the entire breast.  ¨ Repeat this procedure for your left breast, using the pads of the 3 middle fingers of your right hand. · To examine your breasts while in the shower:  ¨ Place one arm over your head and lightly soap your breast on that side. ¨ Using the pads of your fingers, gently move your hand over your breast (in the strip pattern described above), feeling carefully for any lumps or changes. ¨ Repeat for the other breast.  · Have your doctor inspect anything you notice to see if you need further testing. Where can you learn more? Go to http://deonte-leah.info/. Enter P148 in the search box to learn more about \"Breast Self-Exam: Care Instructions. \"  Current as of: May 12, 2017  Content Version: 11.4  © 3716-1494 Healthwise, Incorporated. Care instructions adapted under license by [employer] (which disclaims liability or warranty for this information). If you have questions about a medical condition or this instruction, always ask your healthcare professional. [place] [address] any warranty or liability for your use of this information.

## 2018-03-05 NOTE — LETTER
NOTIFICATION RETURN TO WORK 
 
3/5/2018 2:53 PM 
 
Ms. [address] [id number] To Whom It May Concern: 
 
Husam oDty is currently under the care of [address] [place] [address]. She will be off work on 3/5/18, 3/6/18 and 3/7/18. She will return to work on 3/8/18. If there are questions or concerns please have the patient contact our office. Sincerely, Elida Chaparro MD

## 2018-03-06 PROBLEM — N61.1 LEFT BREAST ABSCESS: Status: ACTIVE | Noted: 2018-03-06

## 2018-03-12 ENCOUNTER — OFFICE VISIT (OUTPATIENT)
Dept: SURGERY | Age: 33
End: 2018-03-12

## 2018-03-12 VITALS
HEART RATE: 74 BPM | BODY MASS INDEX: 35.84 KG/M2 | SYSTOLIC BLOOD PRESSURE: 137 MMHG | DIASTOLIC BLOOD PRESSURE: 58 MMHG | WEIGHT: 242 LBS | HEIGHT: 69 IN

## 2018-03-12 DIAGNOSIS — N61.1 LEFT BREAST ABSCESS: Primary | ICD-10-CM

## 2018-03-12 RX ORDER — ACETAMINOPHEN 325 MG/1
TABLET ORAL
COMMUNITY

## 2018-03-12 NOTE — PATIENT INSTRUCTIONS
Wound Care: After Your Visit  Your Care Instructions  Taking good care of your wound at home will help it heal quickly and reduce your chance of infection. The doctor has checked you carefully, but problems can develop later. If you notice any problems or new symptoms, get medical treatment right away. Follow-up care is a key part of your treatment and safety. Be sure to make and go to all appointments, and call your doctor if you are having problems. It's also a good idea to know your test results and keep a list of the medicines you take. How can you care for yourself at home? · Clean the area with soap and water 2 times a day unless your doctor gives you different instructions. Don't use hydrogen peroxide or alcohol, which can slow healing. ¨ You may cover the wound with a thin layer of antibiotic ointment, such as bacitracin, and a nonstick bandage. ¨ Apply more ointment and replace the bandage as needed. · Take pain medicines exactly as directed. Some pain is normal with a wound, but do not ignore pain that is getting worse instead of better. You could have an infection. ¨ If the doctor gave you a prescription medicine for pain, take it as prescribed. ¨ If you are not taking a prescription pain medicine, ask your doctor if you can take an over-the-counter medicine. · Your doctor may have closed your wound with stitches (sutures), staples, or skin glue. ¨ If you have stitches, your doctor may remove them after several days to 2 weeks. Or you may have stitches that dissolve on their own. ¨ If you have staples, your doctor may remove them after 7 to 10 days. ¨ If your wound was closed with skin glue, the glue will wear off in a few days to 2 weeks. When should you call for help? Call your doctor now or seek immediate medical care if:  · You have signs of infection, such as:  ¨ Increased pain, swelling, warmth, or redness near the wound. ¨ Red streaks leading from the wound.   ¨ Pus draining from the wound. ¨ A fever. · You bleed so much from your incision that you soak one or more bandages over 2 to 4 hours. Watch closely for changes in your health, and be sure to contact your doctor if:  · The wound is not getting better each day. Where can you learn more? Go to [employer]  Enter M973 in the search box to learn more about \"Wound Care: After Your Visit. \"   © 4079-4367 Healthwise, Incorporated. Care instructions adapted under license by [employer] (which disclaims liability or warranty for this information). This care instruction is for use with your licensed healthcare professional. If you have questions about a medical condition or this instruction, always ask your healthcare professional. [address] any warranty or liability for your use of this information. Content Version: 97.4.704998;  Last Revised: April 23, 2012

## 2018-03-12 NOTE — LETTER
NOTIFICATION RETURN TO WORK 
 
3/12/2018 10:12 AM 
 
Ms. [address] [phone] To Whom It May Concern: 
 
Mary Zuleta is currently under the care of [address] [place] [address]. Please excuse Ms. Miller from work on Monday, March 12, 2018, as she had a doctor's appointment. If there are questions or concerns please have the patient contact our office. Sincerely, Leah Moreno MD

## 2018-03-12 NOTE — PROGRESS NOTES
HISTORY OF PRESENT ILLNESS  Tarah Porter is a 33 y.o. female. HPI ESTABLISHED patient here for 1 week f/u LEFT breast abscess s/p I&D. Overall, her breast feels so much better. Not having any pain. Had been packing the wound up until last night because it has closed. Had a lot of drainage from site especially during shower. Continues abx but almost complete.      LEFT breast abscess culture from 2/26/18-   Aerobic culture   Mixed skin michael   Scant growth       FH: Maternal grandmother had breast cancer, unsure what age. Review of Systems   All other systems reviewed and are negative. Physical Exam   Pulmonary/Chest:       Left breast incision healed. No fluctuance. Decreasing induration no pain or erythema. Nursing note and vitals reviewed. ASSESSMENT and PLAN    ICD-10-CM ICD-9-CM    1. Left breast abscess N61.1 611.0      - healing. Finish course abx  - if recurs may need duct excision in or.

## 2022-03-19 PROBLEM — N61.1 LEFT BREAST ABSCESS: Status: ACTIVE | Noted: 2018-03-06

## 2022-10-31 DIAGNOSIS — M79.671 RIGHT FOOT PAIN: Primary | ICD-10-CM

## 2022-11-02 ENCOUNTER — HOSPITAL ENCOUNTER (OUTPATIENT)
Dept: GENERAL RADIOLOGY | Age: 37
Discharge: HOME | End: 2022-11-02
Payer: COMMERCIAL

## 2022-11-02 DIAGNOSIS — M79.671 RIGHT FOOT PAIN: ICD-10-CM

## 2022-11-02 PROCEDURE — 73630 X-RAY EXAM OF FOOT: CPT

## 2022-11-04 ENCOUNTER — OFFICE VISIT (OUTPATIENT)
Dept: ORTHOPEDIC SURGERY | Age: 37
End: 2022-11-04
Payer: COMMERCIAL

## 2022-11-04 VITALS
OXYGEN SATURATION: 97 % | SYSTOLIC BLOOD PRESSURE: 134 MMHG | HEART RATE: 74 BPM | WEIGHT: 279 LBS | BODY MASS INDEX: 41.32 KG/M2 | HEIGHT: 69 IN | TEMPERATURE: 97.5 F | DIASTOLIC BLOOD PRESSURE: 90 MMHG

## 2022-11-04 DIAGNOSIS — S86.011A ACHILLES TENDON TEAR, RIGHT, INITIAL ENCOUNTER: Primary | ICD-10-CM

## 2022-11-04 PROCEDURE — 99203 OFFICE O/P NEW LOW 30 MIN: CPT | Performed by: ORTHOPAEDIC SURGERY

## 2022-11-04 NOTE — PROGRESS NOTES
Identified pt with two pt identifiers (name and ). Reviewed chart in preparation for visit and have obtained necessary documentation. Valencia Garg is a 40 y.o. female  Chief Complaint   Patient presents with    Foot Pain     RT Foot     Visit Vitals  BP (!) 134/90 (BP 1 Location: Right arm, BP Patient Position: Sitting, BP Cuff Size: Large adult)   Pulse 74   Temp 97.5 °F (36.4 °C) (Tympanic)   Ht 5' 9\" (1.753 m)   Wt 279 lb (126.6 kg)   SpO2 97%   BMI 41.20 kg/m²     1. Have you been to the ER, urgent care clinic since your last visit? Hospitalized since your last visit? No    2. Have you seen or consulted any other health care providers outside of the [address] [place] [address] since your last visit? Include any pap smears or colon screening.  No

## 2022-11-04 NOTE — PERIOP NOTES
[facility]  Preoperative Instructions        Surgery Date 11/7/2022   Time of Arrival 1:00pm  [phone]    1. On the day of your surgery, please report to the Surgical Services Registration Desk and sign in at your designated time. The Surgery Center is located to the right of the Emergency Room. 2. You must have someone with you to drive you home. You should not drive a car for 24 hours following surgery. Please make arrangements for a friend or family member to stay with you for the first 24 hours after your surgery. 3. Do not have anything to eat or drink (including water, gum, mints, coffee, juice) after midnight   11/6/2022. ? This may not apply to medications prescribed by your physician. ?(Please note below the special instructions with medications to take the morning of your procedure.)    4. We recommend you do not drink any alcoholic beverages for 24 hours before and after your surgery. 5. Contact your surgeons office for instructions on the following medications: non-steroidal anti-inflammatory drugs (i.e. Advil, Aleve), vitamins, and supplements. (Some surgeons will want you to stop these medications prior to surgery and others may allow you to take them)  **If you are currently taking Plavix, Coumadin, Aspirin and/or other blood-thinning agents, contact your surgeon for instructions. ** Your surgeon will partner with the physician prescribing these medications to determine if it is safe to stop or if you need to continue taking. Please do not stop taking these medications without instructions from your surgeon    6. Wear comfortable clothes. Wear glasses instead of contacts. Do not bring any money or jewelry. Please bring picture ID, insurance card, and any prearranged co-payment or hospital payment. Do not wear make-up, particularly mascara the morning of your surgery. Do not wear nail polish, particularly if you are having foot /hand surgery.   Wear your hair loose or down, no ponytails, buns, aubrey pins or clips. All body piercings must be removed. Please shower with antibacterial soap for three consecutive days before and on the morning of surgery, but do not apply any lotions, powders or deodorants after the shower on the day of surgery. Please use a fresh towels after each shower. Please sleep in clean clothes and change bed linens the night before surgery. Please do not shave for 48 hours prior to surgery. Shaving of the face is acceptable. 7. You should understand that if you do not follow these instructions your surgery may be cancelled. If your physical condition changes (I.e. fever, cold or flu) please contact your surgeon as soon as possible. 8. It is important that you be on time. If a situation occurs where you may be late, please call [phone] (OR Holding Area). 9. If you have any questions and or problems, please call [phone] (Pre-admission Testing). 10. Your surgery time may be subject to change. You will receive a phone call the evening prior if your time changes. 11.  If having outpatient surgery, you must have someone to drive you here, stay with you during the duration of your stay, and to drive you home at time of discharge. TAKE ALL MEDICATIONS DAY OF SURGERY EXCEPT: none      I understand a pre-operative phone call will be made to verify my surgery time. In the event that I am not available, I give permission for a message to be left on my answering service and/or with another person?   yes         ___________________      __________   _________    (Signature of Patient)             (Witness)                (Date and Time)

## 2022-11-04 NOTE — PROGRESS NOTES
11/4/2022      CC: right ankle and calf pain    HPI:      This is a 40y.o. year old female who fell down stairs. Time since injury is a month. The patient felt a pop in their posterior leg and had difficulty ambulating. No open wounds. No other associated injuries. Treatment so far has consisted of: nothing, she is walking in regular shoes. PMH:  History reviewed. No pertinent past medical history. PSxHx:  Past Surgical History:   Procedure Laterality Date    AR BREAST SURGERY PROCEDURE UNLISTED Left 2014       Meds:    Current Outpatient Medications:     acetaminophen (TYLENOL) 325 mg tablet, Take  by mouth every four (4) hours as needed for Pain. (Patient not taking: Reported on 11/4/2022), Disp: , Rfl:     All:  No Known Allergies    Social Hx:  Social History     Socioeconomic History    Marital status: SINGLE   Tobacco Use    Smoking status: Former     Packs/day: 1.00     Types: Cigarettes    Smokeless tobacco: Never   Substance and Sexual Activity    Alcohol use:  Yes     Alcohol/week: 1.0 standard drink     Types: 1 Standard drinks or equivalent per week     Comment: socially    Drug use: No    Sexual activity: Never     Birth control/protection: None       Family Hx:  Family History   Problem Relation Age of Onset    Hypertension Father     Diabetes Father     Hypertension Paternal Aunt     Diabetes Paternal Aunt     Diabetes Paternal Grandmother     Hypertension Paternal Grandmother     Breast Cancer Maternal Grandmother          Review of Systems:       General: Denies headache, lethargy, fever, weight loss  Ears/Nose/Throat: Denies ear discharge, drainage, nosebleeds, hoarse voice, dental problems  Cardiovascular: Denies chest pain, shortness of breath  Lungs: Denies chest pain, breathing problems, wheezing, pneumonia  Stomach: Denies stomach pain, heartburn, constipation, irritable bowel  Skin: Denies rash, sores, open wounds  Musculoskeletal: right ankle pain  Genitourinary: Denies dysuria, hematuria, polyuria  Gastrointestinal: Denies constipation, obstipation, diarrhea  Neurological: Denies changes in sight, smell, hearing, taste, seizures. Denies loss of consciousness. Psychiatric: Denies depression, sleep pattern changes, anxiety, change in personality  Endocrine: Denies mood swings, heat or cold intolerance  Hematologic/Lymphatic: Denies anemia, purpura, petechia  Allergic/Immunologic: Denies swelling of throat, pain or swelling at lymph nodes      Physical Examination:    Visit Vitals  BP (!) 134/90 (BP 1 Location: Right arm, BP Patient Position: Sitting, BP Cuff Size: Large adult)   Pulse 74   Temp 97.5 °F (36.4 °C) (Tympanic)   Ht 5' 9\" (1.753 m)   Wt 279 lb (126.6 kg)   SpO2 97%   BMI 41.20 kg/m²        General: AOX3, no apparent distress  Psychiatric: mood and affect appropriate  Lungs: breathing is symmetric and unlabored bilaterally  Heart: regular rate and rhythm  Abdomen: no guarding  Head: normocephalic, atraumatic  Skin: No significant abnormalities, good turgor  Sensation intact to light touch: C5-T1 dermatomes  Muscular exam: 5/5 strength in all major muscle groups unless noted in specialty exam.    Extremities          Left lower extremity: No gross deformity. No restriction to range of motion of the hip, knee, ankle. Muscle bulk is appropriate without wasting. Sensation is intact to light touch in the L1-S1 dermatomes. Capillary refill is less than 2 seconds in the fingers. Strength testing is 5/5 at the major muscle groups of the hip, knee, ankle. Right lower extremity:  Positive Elizalde's maneuver, with tenderness to palpation at the achilles tendon. There is a palpable gap in the tendon. There is minimal ecchymosis posteriorly and distally. Flexion and extension of the toes is limited by pain. Sensation intact to light touch in the L1-S1 dermatomes.       Diagnostics:    Pertinent Diagnostics: Xrays of the right ankle indicate no no fractures, osseus lesions, abnormalities, cartilage space is well maintained. Overall alignment is within normal limits, no effusion or other soft tissue abnormality. Assessment: right achilles tendon rupture  Plan: This patient and I discussed the treatment options for this pathology. This can be treated nonoperatively or operatively. Rerupture is statistically higher with nonoperative management. We did discuss the risks and benefits of treatment of nonoperative management of these injuries. This does include, but are not limited to: stiffness, later displacement of fracture, potential for prolonged immobilization and longer recovery times, possible stiffness of associated joints, skin and deep tissue irritation or breakdown. Outcome depends on mobilization of the soft tissues in a regimented and reasonable manner. We did discuss the risks of surgery which include but are not limited to infection, nerve or blood vessel damage, failure of fixation, failure of any possible implant, need for reoperation, postoperative pain and swelling, intra-or postoperative fracture, postoperative dislocation, leg length inequality, need for reoperation, implant failure, death, disability, organ dysfunction, wound healing issues, DVT, PE, and the need for further procedures. The patient did freely state their understanding and satisfaction with our discussion. The patient was counseled at length about the risks of romeo Covid-19 during their perioperative period and any recovery window from their procedure. The patient was made aware that romeo Covid-19  may worsen their prognosis for recovering from their procedure and lend to a higher morbidity and/or mortality risk. All material risks, benefits, and reasonable alternatives including postponing the procedure were discussed. The patient does  wish to proceed with the procedure at this time.         Plan will be for: surgery asap, possible collagen grafting at the time of surgery. Ms. Yamilet Cam has a reminder for a \"due or due soon\" health maintenance. I have asked that she contact her primary care provider for follow-up on this health maintenance.

## 2022-11-05 NOTE — H&P
CC: right ankle and calf pain     HPI:      This is a 40y.o. year old female who fell down stairs. Time since injury is a month. The patient felt a pop in their posterior leg and had difficulty ambulating. No open wounds. No other associated injuries. Treatment so far has consisted of: nothing, she is walking in regular shoes. PMH:  History reviewed. No pertinent past medical history. PSxHx:        Past Surgical History:   Procedure Laterality Date    NE BREAST SURGERY PROCEDURE UNLISTED Left 2014         Meds:     Current Outpatient Medications:     acetaminophen (TYLENOL) 325 mg tablet, Take  by mouth every four (4) hours as needed for Pain. (Patient not taking: Reported on 11/4/2022), Disp: , Rfl:      All:  No Known Allergies     Social Hx:  Social History               Socioeconomic History    Marital status: SINGLE   Tobacco Use    Smoking status: Former       Packs/day: 1.00       Types: Cigarettes    Smokeless tobacco: Never   Substance and Sexual Activity    Alcohol use:  Yes       Alcohol/week: 1.0 standard drink       Types: 1 Standard drinks or equivalent per week       Comment: socially    Drug use: No    Sexual activity: Never       Birth control/protection: None            Family Hx:        Family History   Problem Relation Age of Onset    Hypertension Father      Diabetes Father      Hypertension Paternal Aunt      Diabetes Paternal Aunt      Diabetes Paternal Grandmother      Hypertension Paternal Grandmother      Breast Cancer Maternal Grandmother              Review of Systems:         General: Denies headache, lethargy, fever, weight loss  Ears/Nose/Throat: Denies ear discharge, drainage, nosebleeds, hoarse voice, dental problems  Cardiovascular: Denies chest pain, shortness of breath  Lungs: Denies chest pain, breathing problems, wheezing, pneumonia  Stomach: Denies stomach pain, heartburn, constipation, irritable bowel  Skin: Denies rash, sores, open wounds  Musculoskeletal: right ankle pain  Genitourinary: Denies dysuria, hematuria, polyuria  Gastrointestinal: Denies constipation, obstipation, diarrhea  Neurological: Denies changes in sight, smell, hearing, taste, seizures. Denies loss of consciousness. Psychiatric: Denies depression, sleep pattern changes, anxiety, change in personality  Endocrine: Denies mood swings, heat or cold intolerance  Hematologic/Lymphatic: Denies anemia, purpura, petechia  Allergic/Immunologic: Denies swelling of throat, pain or swelling at lymph nodes        Physical Examination:     Visit Vitals  BP (!) 134/90 (BP 1 Location: Right arm, BP Patient Position: Sitting, BP Cuff Size: Large adult)   Pulse 74   Temp 97.5 °F (36.4 °C) (Tympanic)   Ht 5' 9\" (1.753 m)   Wt 279 lb (126.6 kg)   SpO2 97%   BMI 41.20 kg/m²         General: AOX3, no apparent distress  Psychiatric: mood and affect appropriate  Lungs: breathing is symmetric and unlabored bilaterally  Heart: regular rate and rhythm  Abdomen: no guarding  Head: normocephalic, atraumatic  Skin: No significant abnormalities, good turgor  Sensation intact to light touch: C5-T1 dermatomes  Muscular exam: 5/5 strength in all major muscle groups unless noted in specialty exam.    Extremities              Left lower extremity: No gross deformity. No restriction to range of motion of the hip, knee, ankle. Muscle bulk is appropriate without wasting. Sensation is intact to light touch in the L1-S1 dermatomes. Capillary refill is less than 2 seconds in the fingers. Strength testing is 5/5 at the major muscle groups of the hip, knee, ankle. Right lower extremity:  Positive Elizalde's maneuver, with tenderness to palpation at the achilles tendon. There is a palpable gap in the tendon. There is minimal ecchymosis posteriorly and distally. Flexion and extension of the toes is limited by pain. Sensation intact to light touch in the L1-S1 dermatomes.         Diagnostics:     Pertinent Diagnostics: Xrays of the right ankle indicate no no fractures, osseus lesions, abnormalities, cartilage space is well maintained. Overall alignment is within normal limits, no effusion or other soft tissue abnormality. Assessment: right achilles tendon rupture  Plan: This patient and I discussed the treatment options for this pathology. This can be treated nonoperatively or operatively. Rerupture is statistically higher with nonoperative management. We did discuss the risks and benefits of treatment of nonoperative management of these injuries. This does include, but are not limited to: stiffness, later displacement of fracture, potential for prolonged immobilization and longer recovery times, possible stiffness of associated joints, skin and deep tissue irritation or breakdown. Outcome depends on mobilization of the soft tissues in a regimented and reasonable manner. We did discuss the risks of surgery which include but are not limited to infection, nerve or blood vessel damage, failure of fixation, failure of any possible implant, need for reoperation, postoperative pain and swelling, intra-or postoperative fracture, postoperative dislocation, leg length inequality, need for reoperation, implant failure, death, disability, organ dysfunction, wound healing issues, DVT, PE, and the need for further procedures. The patient did freely state their understanding and satisfaction with our discussion. The patient was counseled at length about the risks of romeo Covid-19 during their perioperative period and any recovery window from their procedure. The patient was made aware that romeo Covid-19  may worsen their prognosis for recovering from their procedure and lend to a higher morbidity and/or mortality risk. All material risks, benefits, and reasonable alternatives including postponing the procedure were discussed. The patient does  wish to proceed with the procedure at this time.

## 2022-11-07 ENCOUNTER — ANESTHESIA (OUTPATIENT)
Dept: SURGERY | Age: 37
End: 2022-11-07
Payer: COMMERCIAL

## 2022-11-07 ENCOUNTER — HOSPITAL ENCOUNTER (OUTPATIENT)
Age: 37
Setting detail: OUTPATIENT SURGERY
Discharge: HOME | End: 2022-11-07
Attending: ORTHOPAEDIC SURGERY | Admitting: ORTHOPAEDIC SURGERY
Payer: COMMERCIAL

## 2022-11-07 ENCOUNTER — ANESTHESIA EVENT (OUTPATIENT)
Dept: SURGERY | Age: 37
End: 2022-11-07
Payer: COMMERCIAL

## 2022-11-07 VITALS
HEIGHT: 67 IN | DIASTOLIC BLOOD PRESSURE: 82 MMHG | HEART RATE: 60 BPM | RESPIRATION RATE: 16 BRPM | OXYGEN SATURATION: 100 % | WEIGHT: 277.78 LBS | BODY MASS INDEX: 43.6 KG/M2 | TEMPERATURE: 97.9 F | SYSTOLIC BLOOD PRESSURE: 137 MMHG

## 2022-11-07 DIAGNOSIS — N61.1 LEFT BREAST ABSCESS: Primary | ICD-10-CM

## 2022-11-07 DIAGNOSIS — Z47.89 ORTHOPEDIC AFTERCARE: ICD-10-CM

## 2022-11-07 LAB — HCG UR QL: NEGATIVE

## 2022-11-07 PROCEDURE — 77030019908 HC STETH ESOPH SIMS -A: Performed by: STUDENT IN AN ORGANIZED HEALTH CARE EDUCATION/TRAINING PROGRAM

## 2022-11-07 PROCEDURE — 77030031139 HC SUT VCRL2 J&J -A: Performed by: ORTHOPAEDIC SURGERY

## 2022-11-07 PROCEDURE — 77030000032 HC CUF TRNQT ZIMM -B: Performed by: ORTHOPAEDIC SURGERY

## 2022-11-07 PROCEDURE — 74011250636 HC RX REV CODE- 250/636: Performed by: ORTHOPAEDIC SURGERY

## 2022-11-07 PROCEDURE — 77030026438 HC STYL ET INTUB CARD -A: Performed by: STUDENT IN AN ORGANIZED HEALTH CARE EDUCATION/TRAINING PROGRAM

## 2022-11-07 PROCEDURE — 77030021678 HC GLIDESCP STAT DISP VERT -B: Performed by: STUDENT IN AN ORGANIZED HEALTH CARE EDUCATION/TRAINING PROGRAM

## 2022-11-07 PROCEDURE — 77030002916 HC SUT ETHLN J&J -A: Performed by: ORTHOPAEDIC SURGERY

## 2022-11-07 PROCEDURE — 76210000006 HC OR PH I REC 0.5 TO 1 HR: Performed by: ORTHOPAEDIC SURGERY

## 2022-11-07 PROCEDURE — 77030013079 HC BLNKT BAIR HGGR 3M -A: Performed by: STUDENT IN AN ORGANIZED HEALTH CARE EDUCATION/TRAINING PROGRAM

## 2022-11-07 PROCEDURE — 74011250637 HC RX REV CODE- 250/637: Performed by: ORTHOPAEDIC SURGERY

## 2022-11-07 PROCEDURE — 77030008684 HC TU ET CUF COVD -B: Performed by: STUDENT IN AN ORGANIZED HEALTH CARE EDUCATION/TRAINING PROGRAM

## 2022-11-07 PROCEDURE — 76010000149 HC OR TIME 1 TO 1.5 HR: Performed by: ORTHOPAEDIC SURGERY

## 2022-11-07 PROCEDURE — 27650 REPAIR ACHILLES TENDON: CPT | Performed by: ORTHOPAEDIC SURGERY

## 2022-11-07 PROCEDURE — 74011000250 HC RX REV CODE- 250

## 2022-11-07 PROCEDURE — 77030042408 HC SUT FBRTAPE -C: Performed by: ORTHOPAEDIC SURGERY

## 2022-11-07 PROCEDURE — 74011250636 HC RX REV CODE- 250/636: Performed by: ANESTHESIOLOGY

## 2022-11-07 PROCEDURE — 76060000033 HC ANESTHESIA 1 TO 1.5 HR: Performed by: ORTHOPAEDIC SURGERY

## 2022-11-07 PROCEDURE — 74011250636 HC RX REV CODE- 250/636: Performed by: STUDENT IN AN ORGANIZED HEALTH CARE EDUCATION/TRAINING PROGRAM

## 2022-11-07 PROCEDURE — 76210000026 HC REC RM PH II 1 TO 1.5 HR: Performed by: ORTHOPAEDIC SURGERY

## 2022-11-07 PROCEDURE — 74011000250 HC RX REV CODE- 250: Performed by: ORTHOPAEDIC SURGERY

## 2022-11-07 PROCEDURE — 74011000250 HC RX REV CODE- 250: Performed by: STUDENT IN AN ORGANIZED HEALTH CARE EDUCATION/TRAINING PROGRAM

## 2022-11-07 PROCEDURE — 2709999900 HC NON-CHARGEABLE SUPPLY: Performed by: ORTHOPAEDIC SURGERY

## 2022-11-07 PROCEDURE — 81025 URINE PREGNANCY TEST: CPT

## 2022-11-07 RX ORDER — ACETAMINOPHEN 500 MG
1000 TABLET ORAL ONCE
Status: COMPLETED | OUTPATIENT
Start: 2022-11-07 | End: 2022-11-07

## 2022-11-07 RX ORDER — HYDROCODONE BITARTRATE AND ACETAMINOPHEN 5; 325 MG/1; MG/1
1 TABLET ORAL ONCE
Status: COMPLETED | OUTPATIENT
Start: 2022-11-07 | End: 2022-11-07

## 2022-11-07 RX ORDER — MIDAZOLAM HYDROCHLORIDE 1 MG/ML
INJECTION, SOLUTION INTRAMUSCULAR; INTRAVENOUS AS NEEDED
Status: DISCONTINUED | OUTPATIENT
Start: 2022-11-07 | End: 2022-11-07 | Stop reason: HOSPADM

## 2022-11-07 RX ORDER — PROPOFOL 10 MG/ML
INJECTION, EMULSION INTRAVENOUS AS NEEDED
Status: DISCONTINUED | OUTPATIENT
Start: 2022-11-07 | End: 2022-11-07 | Stop reason: HOSPADM

## 2022-11-07 RX ORDER — ASPIRIN 325 MG
325 TABLET, DELAYED RELEASE (ENTERIC COATED) ORAL
Qty: 60 TABLET | Refills: 0 | Status: SHIPPED | OUTPATIENT
Start: 2022-11-07

## 2022-11-07 RX ORDER — FENTANYL CITRATE 50 UG/ML
25 INJECTION, SOLUTION INTRAMUSCULAR; INTRAVENOUS
Status: DISCONTINUED | OUTPATIENT
Start: 2022-11-07 | End: 2022-11-07 | Stop reason: HOSPADM

## 2022-11-07 RX ORDER — DEXMEDETOMIDINE HYDROCHLORIDE 100 UG/ML
INJECTION, SOLUTION INTRAVENOUS AS NEEDED
Status: DISCONTINUED | OUTPATIENT
Start: 2022-11-07 | End: 2022-11-07 | Stop reason: HOSPADM

## 2022-11-07 RX ORDER — PROPOFOL 10 MG/ML
INJECTION, EMULSION INTRAVENOUS
Status: DISCONTINUED | OUTPATIENT
Start: 2022-11-07 | End: 2022-11-07 | Stop reason: HOSPADM

## 2022-11-07 RX ORDER — CELECOXIB 200 MG/1
200 CAPSULE ORAL ONCE
Status: COMPLETED | OUTPATIENT
Start: 2022-11-07 | End: 2022-11-07

## 2022-11-07 RX ORDER — NEOSTIGMINE METHYLSULFATE 1 MG/ML
INJECTION, SOLUTION INTRAVENOUS AS NEEDED
Status: DISCONTINUED | OUTPATIENT
Start: 2022-11-07 | End: 2022-11-07 | Stop reason: HOSPADM

## 2022-11-07 RX ORDER — DEXAMETHASONE SODIUM PHOSPHATE 4 MG/ML
INJECTION, SOLUTION INTRA-ARTICULAR; INTRALESIONAL; INTRAMUSCULAR; INTRAVENOUS; SOFT TISSUE AS NEEDED
Status: DISCONTINUED | OUTPATIENT
Start: 2022-11-07 | End: 2022-11-07 | Stop reason: HOSPADM

## 2022-11-07 RX ORDER — ROCURONIUM BROMIDE 10 MG/ML
INJECTION, SOLUTION INTRAVENOUS AS NEEDED
Status: DISCONTINUED | OUTPATIENT
Start: 2022-11-07 | End: 2022-11-07 | Stop reason: HOSPADM

## 2022-11-07 RX ORDER — GLYCOPYRROLATE 0.2 MG/ML
INJECTION INTRAMUSCULAR; INTRAVENOUS AS NEEDED
Status: DISCONTINUED | OUTPATIENT
Start: 2022-11-07 | End: 2022-11-07 | Stop reason: HOSPADM

## 2022-11-07 RX ORDER — LIDOCAINE HYDROCHLORIDE 20 MG/ML
INJECTION, SOLUTION EPIDURAL; INFILTRATION; INTRACAUDAL; PERINEURAL AS NEEDED
Status: DISCONTINUED | OUTPATIENT
Start: 2022-11-07 | End: 2022-11-07 | Stop reason: HOSPADM

## 2022-11-07 RX ORDER — OXYCODONE HYDROCHLORIDE 5 MG/1
5 TABLET ORAL AS NEEDED
Status: DISCONTINUED | OUTPATIENT
Start: 2022-11-07 | End: 2022-11-07 | Stop reason: HOSPADM

## 2022-11-07 RX ORDER — KETAMINE HYDROCHLORIDE 10 MG/ML
INJECTION, SOLUTION INTRAMUSCULAR; INTRAVENOUS AS NEEDED
Status: DISCONTINUED | OUTPATIENT
Start: 2022-11-07 | End: 2022-11-07 | Stop reason: HOSPADM

## 2022-11-07 RX ORDER — ONDANSETRON 2 MG/ML
INJECTION INTRAMUSCULAR; INTRAVENOUS AS NEEDED
Status: DISCONTINUED | OUTPATIENT
Start: 2022-11-07 | End: 2022-11-07 | Stop reason: HOSPADM

## 2022-11-07 RX ORDER — HYDROCODONE BITARTRATE AND ACETAMINOPHEN 5; 325 MG/1; MG/1
1 TABLET ORAL
Qty: 18 TABLET | Refills: 0 | Status: SHIPPED | OUTPATIENT
Start: 2022-11-07 | End: 2022-11-10

## 2022-11-07 RX ORDER — ROPIVACAINE HYDROCHLORIDE 5 MG/ML
INJECTION, SOLUTION EPIDURAL; INFILTRATION; PERINEURAL AS NEEDED
Status: DISCONTINUED | OUTPATIENT
Start: 2022-11-07 | End: 2022-11-07 | Stop reason: HOSPADM

## 2022-11-07 RX ORDER — FENTANYL CITRATE 50 UG/ML
INJECTION, SOLUTION INTRAMUSCULAR; INTRAVENOUS AS NEEDED
Status: DISCONTINUED | OUTPATIENT
Start: 2022-11-07 | End: 2022-11-07 | Stop reason: HOSPADM

## 2022-11-07 RX ORDER — ONDANSETRON 2 MG/ML
4 INJECTION INTRAMUSCULAR; INTRAVENOUS AS NEEDED
Status: DISCONTINUED | OUTPATIENT
Start: 2022-11-07 | End: 2022-11-07 | Stop reason: HOSPADM

## 2022-11-07 RX ORDER — HYDROMORPHONE HYDROCHLORIDE 1 MG/ML
0.2 INJECTION, SOLUTION INTRAMUSCULAR; INTRAVENOUS; SUBCUTANEOUS
Status: DISCONTINUED | OUTPATIENT
Start: 2022-11-07 | End: 2022-11-07 | Stop reason: HOSPADM

## 2022-11-07 RX ORDER — SODIUM CHLORIDE, SODIUM LACTATE, POTASSIUM CHLORIDE, CALCIUM CHLORIDE 600; 310; 30; 20 MG/100ML; MG/100ML; MG/100ML; MG/100ML
25 INJECTION, SOLUTION INTRAVENOUS CONTINUOUS
Status: DISCONTINUED | OUTPATIENT
Start: 2022-11-07 | End: 2022-11-07 | Stop reason: HOSPADM

## 2022-11-07 RX ADMIN — DEXMEDETOMIDINE HYDROCHLORIDE 2 MCG: 100 INJECTION, SOLUTION, CONCENTRATE INTRAVENOUS at 14:20

## 2022-11-07 RX ADMIN — LIDOCAINE HYDROCHLORIDE 100 MG: 20 INJECTION, SOLUTION EPIDURAL; INFILTRATION; INTRACAUDAL; PERINEURAL at 13:49

## 2022-11-07 RX ADMIN — FENTANYL CITRATE 25 MCG: 50 INJECTION INTRAMUSCULAR; INTRAVENOUS at 15:08

## 2022-11-07 RX ADMIN — DEXMEDETOMIDINE HYDROCHLORIDE 4 MCG: 100 INJECTION, SOLUTION, CONCENTRATE INTRAVENOUS at 14:00

## 2022-11-07 RX ADMIN — DEXAMETHASONE SODIUM PHOSPHATE 4 MG: 4 INJECTION, SOLUTION INTRAMUSCULAR; INTRAVENOUS at 13:57

## 2022-11-07 RX ADMIN — DEXMEDETOMIDINE HYDROCHLORIDE 8 MCG: 100 INJECTION, SOLUTION, CONCENTRATE INTRAVENOUS at 14:31

## 2022-11-07 RX ADMIN — PROPOFOL 150 MCG/KG/MIN: 10 INJECTION, EMULSION INTRAVENOUS at 13:45

## 2022-11-07 RX ADMIN — FENTANYL CITRATE 50 MCG: 50 INJECTION, SOLUTION INTRAMUSCULAR; INTRAVENOUS at 14:25

## 2022-11-07 RX ADMIN — ONDANSETRON HYDROCHLORIDE 4 MG: 2 INJECTION, SOLUTION INTRAMUSCULAR; INTRAVENOUS at 14:36

## 2022-11-07 RX ADMIN — GLYCOPYRROLATE 0.1 MG: 0.2 INJECTION, SOLUTION INTRAMUSCULAR; INTRAVENOUS at 13:43

## 2022-11-07 RX ADMIN — PROPOFOL 100 MG: 10 INJECTION, EMULSION INTRAVENOUS at 13:50

## 2022-11-07 RX ADMIN — SODIUM CHLORIDE, POTASSIUM CHLORIDE, SODIUM LACTATE AND CALCIUM CHLORIDE 25 ML/HR: 600; 310; 30; 20 INJECTION, SOLUTION INTRAVENOUS at 13:40

## 2022-11-07 RX ADMIN — Medication 3 AMPULE: at 13:24

## 2022-11-07 RX ADMIN — Medication 4 MG: at 14:41

## 2022-11-07 RX ADMIN — MIDAZOLAM HYDROCHLORIDE 2 MG: 1 INJECTION, SOLUTION INTRAMUSCULAR; INTRAVENOUS at 13:44

## 2022-11-07 RX ADMIN — FENTANYL CITRATE 50 MCG: 50 INJECTION, SOLUTION INTRAMUSCULAR; INTRAVENOUS at 13:49

## 2022-11-07 RX ADMIN — HYDROCODONE BITARTRATE AND ACETAMINOPHEN 1 TABLET: 5; 325 TABLET ORAL at 15:30

## 2022-11-07 RX ADMIN — GLYCOPYRROLATE 0.6 MG: 0.2 INJECTION, SOLUTION INTRAMUSCULAR; INTRAVENOUS at 14:41

## 2022-11-07 RX ADMIN — ROCURONIUM BROMIDE 50 MG: 10 INJECTION INTRAVENOUS at 13:51

## 2022-11-07 RX ADMIN — KETAMINE HYDROCHLORIDE 30 MG: 10 INJECTION, SOLUTION INTRAMUSCULAR; INTRAVENOUS at 14:15

## 2022-11-07 RX ADMIN — Medication 1000 MG: at 13:24

## 2022-11-07 RX ADMIN — PROPOFOL 200 MG: 10 INJECTION, EMULSION INTRAVENOUS at 13:49

## 2022-11-07 RX ADMIN — KETAMINE HYDROCHLORIDE 20 MG: 10 INJECTION, SOLUTION INTRAMUSCULAR; INTRAVENOUS at 13:57

## 2022-11-07 RX ADMIN — WATER 3 G: 1 INJECTION INTRAMUSCULAR; INTRAVENOUS; SUBCUTANEOUS at 14:00

## 2022-11-07 RX ADMIN — CELECOXIB 200 MG: 200 CAPSULE ORAL at 13:24

## 2022-11-07 NOTE — PERIOP NOTES
2021 [facility] from Operating Room to PACU    Report received from [address] Whalen regarding Claudetta Byars.      Surgeon(s):  Odalys Blanco DO  And Procedure(s) (LRB):  RIGHT ACHILLES TENDON REPAIR (Right)  confirmed   with allergies and dressings discussed. Anesthesia type, drugs, patient history, complications, estimated blood loss, vital signs, intake and output, and last pain medication, lines, reversal medications, and temperature were reviewed. 1530 Report given to [facility]. Pt and all belongings transported to phase II.

## 2022-11-07 NOTE — ANESTHESIA POSTPROCEDURE EVALUATION
Procedure(s):  RIGHT ACHILLES TENDON REPAIR.    regional, general, total IV anesthesia    Anesthesia Post Evaluation      Multimodal analgesia: multimodal analgesia used between 6 hours prior to anesthesia start to PACU discharge  Patient location during evaluation: bedside  Patient participation: complete - patient participated  Level of consciousness: awake  Pain management: adequate  Airway patency: patent  Anesthetic complications: no  Cardiovascular status: acceptable  Respiratory status: acceptable  Hydration status: acceptable  Post anesthesia nausea and vomiting:  controlled  Final Post Anesthesia Temperature Assessment:  Normothermia (36.0-37.5 degrees C)      INITIAL Post-op Vital signs:   Vitals Value Taken Time   /98 11/07/22 1530   Temp 36.4 °C (97.6 °F) 11/07/22 1458   Pulse 58 11/07/22 1542   Resp 15 11/07/22 1542   SpO2 100 % 11/07/22 1542   Vitals shown include unvalidated device data.

## 2022-11-07 NOTE — DISCHARGE SUMMARY
Date of Admission: 11/7/2022  Date of Discharge: 11/7/2022    Attending on admission and discharge: Dr. Jace Carpenter    Admitting Diagnosis: right achilles tendon tear   Discharge Diagnosis: same  Procedure Performed: right achilles tendon repair    Hospital Course and Treatment:     The patient was admitted through the preop holding area. The patient tolerated the procedure well and was taken to the pacu for further observation and treatment. The patient was maintained on IV fluids until tolerating a PO diet. They were also maintained on sequential compression devices and DVT prophylaxis. The diet was advanced as tolerated. The patient was mobilized. There were no complications during the course of the hospital stay, and the patient was deemed medically stable for discharge to home. After consultation with physical therapy, the patient was cleared for discharge. Discharge instructions: The patient should not be in a pool or tub, or otherwise immerse the wound in water. The patient has been counseled on the importance of mobilizing and moving at least every two hours to help prevent blood clots. The patient should call Dr. Piter Otero office or go to an emergency department if they note a fever over 101.1 degrees fahrenheit, more or unrelieved pain, more or new swelling at the operative site, or any drainage from the wound. Condition at Discharge: Stable    Discharge medications:  Resume regular home medications  Additional medications to be prescribed on discharge    Norco  aspirin      Follow up instructions: The patient should follow up in 2  weeks for a wound check and xrays with Dr. Simi Jewell.

## 2022-11-07 NOTE — DISCHARGE INSTRUCTIONS
Lower leg tendon fixation surgery: If you have specific questions: CALL OUR OFFICE: [phone]    DRESSING:  Keep dressing on, clean, and dry. If your dressing becomes soiled, bloody, has an abnormal smell, or wet, call Dr. Mary Grace Zayas office for instructions. Unless you've been given a shoe with tread  to fit over the dressing, do not place the foot on the ground. ACTIVITY:    Do not bear weight on the operative leg. Keep the operative leg elevated as much as possible for the next two weeks until your swelling diminishes. You have been provided crutches for safety. You should not operate heavy machinery, drive, do heavy lifting, squatting, or kneeling until otherwise instructed. It is important to begin gentle range of motion of your toes to your tolerance as soon as possible. GENERAL PAIN CONTROL:    IF YOU HAVE HAD A NERVE BLOCK: remember, you will have an increase in pain sometime in the first 24 hours after your surgery. This can be significant, make sure you are consistently medicating and expect that increase in pain. This is normal, but will require you to be proactive in your pain control. If this increase in pain persists - call Dr. Mary Grace Zayas office. Take pain medications as needed and prescribed. Never exceed the maximum dosage. It is reasonable to take something for pain prior to night time on the first night to avoid severe pain in the night. Ice is an important part of your recovery, and best if you do not apply ice or ice pack directly to skin, but use a towel or cloth on your skin. Do this for twenty minutes on the skin, then at least twenty minutes off of your skin. GENERAL POSTOPERATIVE INSTRUCTIONS:    If you have specific questions: CALL OUR OFFICE: [phone]    Diet: It is OK to resume your regular diet postoperatively. Start with light liquids and then slowly increase what you eat to avoid postoperative nausea and vomiting.   If you do experience nausea, restrict fatty or greasy foods until this passes. If you have continued issues, please call our office number for help: [phone]. Dressings: In general, dressings should remain dry and clean. In many cases, these should stay on until the postoperative visit, unless you've been directed to do so otherwise. Additionally, if the dressing becomes soiled or bloody, please call our office for further instructions. Ice instructions:  Ice is important for pain relief, but can be dangerous if improperly applied. Always make sure that NO ice has direct contact with the skin, and make sure that it never stays on the affected area more than 20 minutes. It is important to have ice off of the area for more than 20 minutes after a session to allow the skin to normalize temperature. It is ok to ice through casts, splints, and dressings, and can still be helpful. Weight bearing: Please refer to the specific surgical weight bearing instructions. Please do not exceed the weight limits as prescribed, as this could cause a surgical complication, wound healing problem, or other issue that could otherwise be avoided. Warning signs: The patient should call Dr. Aniya Young office or go to an emergency department if they note a fever over 101.1 degrees fahrenheit, more or unrelieved pain, more or new swelling at the operative site, discoloration of the extremity any drainage from the wound.     DISCHARGE SUMMARY from Nurse    PATIENT INSTRUCTIONS:    After general anesthesia or intravenous sedation, for 24 hours or while taking prescription narcotics:    Have someone responsible help you with your care  Limit your activities  Do not drive and operate hazardous machinery  Do not make important personal, legal or business decisions  Do not drink alcoholic beverages  If you have not urinated within 8 hours after discharge, please contact your surgeon on call  Resume your medications unless otherwise instructed    From general anesthesia, intravenous sedation, or while taking prescription narcotics, you may experience:    Drowsiness, dizziness, sleepiness, or confusion  Difficulty remembering or delayed reaction times  Difficulty with your balance, especially while walking, move slowly and carefully, do not make sudden position changes  Difficulty focusing or blurred vision    You may not be aware of slight changes in your behavior and/or your reaction time because of the medication used during and after your procedure. Report the following to your surgeon:  Excessive pain, swelling, redness or odor of or around the surgical area  Temperature over 100.5  Nausea and vomiting lasting longer than 4 hours or if unable to take medications  Any signs of decreased circulation or nerve impairment to extremity: change in color, persistent numbness, tingling, coldness or increase pain  Any questions or concerns         IF YOU REPORT TO AN EMERGENCY ROOM, DOCTOR'S OFFICE OR HOSPITAL WITHIN 24 HOURS AFTER YOUR PROCEDURE, BRING THIS SHEET AND YOUR AFTER VISIT SUMMARY WITH YOU AND GIVE IT TO THE PHYSICIAN OR NURSE ATTENDING YOU. These are general instructions for a healthy lifestyle (if applicable): No smoking/ No tobacco products/ Avoid exposure to secondhand smoke  Surgeon General's Warning:  Quitting smoking now greatly reduces serious risk to your health. Obesity, smoking, and sedentary lifestyle greatly increases your risk for illness    A healthy diet, regular physical exercise & weight monitoring are important for maintaining a healthy lifestyle    You may be retaining fluid if you have a history of heart failure or if you experience any of the following symptoms:  Weight gain of 3 pounds or more overnight or 5 pounds in a week, increased swelling in our hands or feet or shortness of breath while lying flat in bed.   Please call your doctor as soon as you notice any of these symptoms; do not wait until your next office visit. A common side effect of anesthesia following surgery is nausea and/or vomiting. In order to decrease symptoms, it is wise to avoid foods that are high in fat, greasy foods, milk products, and spicy foods for the first 24 hours. Acceptable foods for the first 24 hours following surgery include but are not limited to:    soup  broth  toast   crackers   applesauce  bananas   mashed potatoes,  soft or scrambled eggs  oatmeal  jello    It is important to eat when taking your pain medication. This will help to prevent nausea. If possible, please try to time your meals with your medications. It is very important to stay hydrated following surgery. Sip fluids frequently while awake. Avoid acidic drinks such as citrus juices and soda for 24 hours. Carbonated beverages may cause bloating and gas. Acceptable fluids include:    water (flavor packets may add variety)  coffee or tea (in moderation)  Gatorade  David-Aid  apple juice  cranberry juice    You are encouraged to cough and deep breathe every hour when awake. This will help to prevent respiratory complications following anesthesia. You may want to hug a pillow when coughing and sneezing to add additional support to the surgical area and to decrease discomfort if you had abdominal or chest surgery. If you are discharged home with support stockings, you may remove them after 24 hours. Support stockings are used to help prevent blood clots in the legs following surgery. TO PREVENT AN INFECTION      [address] YOUR HANDS    To prevent infection, good handwashing is the most important thing you or your caregiver can do. Wash your hands with soap and water or use the hand  we gave you before you touch any wounds. SHOWER    Use the antibacterial soap we gave you when you take a shower. Shower with this soap until your wounds are healed. To reach all areas of your body, you may need someone to help you.      Dont forget to clean your belly button with every shower. USE CLEAN SHEETS    Use freshly cleaned sheets on your bed after surgery. To keep the surgery site clean, do not allow pets to sleep with you while your wound is still healing. STOP SMOKING    Stop smoking, or at least cut back on smoking    Smoking slows your healing. CONTROL YOUR BLOOD SUGAR    High blood sugars slow wound healing. If you are diabetic, control your blood sugar levels before and after your surgery. Please take time to review all of your Home Care Instructions and Medication Information sheets provided in your discharge packet. If you have any questions, please contact your surgeon's office. Thank you. The discharge information has been reviewed with the patient and instruction recipient. The patient and instruction recipient verbalized understanding. Discharge medications reviewed with the patient and instruction recipient and appropriate educational materials and side effects teaching were provided. Please provide this summary of care documentation to your next provider. Hydrocodone/Acetaminophen (Vicodin, Norco, Lortab) - (By mouth)   Why this medicine is used:   Treats pain. Contact a nurse or doctor right away if you have:  Blistering, peeling, red skin rash  Fast or slow heartbeat, shallow breathing, blue lips, fingernails, or skin  Anxiety, restlessness, muscle spasms, twitching, seeing or hearing things that are not there  Dark urine or pale stools, yellow skin or eyes  Extreme weakness, sweating, seizures, cold or clammy skin  Lightheadedness, dizziness, fainting, fever, sweating     Common side effects:  Constipation, nausea, vomiting, loss of appetite, stomach pain  Tiredness or sleepiness  © 2017 [facility] Information is for End User's use only and may not be sold, redistributed or otherwise used for commercial purposes.

## 2022-11-07 NOTE — ANESTHESIA PREPROCEDURE EVALUATION
Relevant Problems   No relevant active problems       Anesthetic History   No history of anesthetic complications            Review of Systems / Medical History  Patient summary reviewed, nursing notes reviewed and pertinent labs reviewed    Pulmonary  Within defined limits            Pertinent negatives: No COPD and asthma  Comments: Former smoker   Neuro/Psych              Cardiovascular                Pertinent negatives: No past MI       GI/Hepatic/Renal             Pertinent negatives: No renal disease   Endo/Other        Morbid obesity  Pertinent negatives: No diabetes   Other Findings            Physical Exam    Airway  Mallampati: II  TM Distance: > 6 cm  Neck ROM: normal range of motion   Mouth opening: Normal     Cardiovascular  Regular rate and rhythm,  S1 and S2 normal,  no murmur, click, rub, or gallop  Rhythm: regular  Rate: normal         Dental  No notable dental hx    Comments: braces   Pulmonary  Breath sounds clear to auscultation               Abdominal  GI exam deferred       Other Findings            Anesthetic Plan    ASA: 2  Anesthesia type: general    Monitoring Plan: BIS      Induction: Intravenous  Anesthetic plan and risks discussed with: Patient

## 2022-11-08 NOTE — OP NOTES
Date of Procedure: 11/7/2022  Preoperative Diagnosis: right achilles tendon tear  Postoperative Diagnosis: same  Procedure Performed: right achilles tendon repair  Surgeon: Marcel Berrios DO  Assistant: none  Anesthesia: general  Estimated Blood Loss: 5cc  Specimens: none  Drains: none  Complications: none  Implants: arthrex fiber tape x 2      Operative Indications: This is a 40 y.o. female who sustained a right achilles tendon rupture. We did discuss the risks of surgery which include but are not limited to infection, nerve or blood vessel damage, failure of fixation, failure of any possible implant, need for reoperation, postoperative pain and swelling, intra-or postoperative fracture, postoperative mechanical failure, need for reoperation, implant failure, death, disability, organ dysfunction, wound healing issues, DVT, PE, and the need for further procedures. The patient did freely state their understanding and satisfaction with our discussion. Appropriate medical clearances have been obtained. Description of Procedure:  After the correct site and side were identified by myself in the holding area, the patient was transported to the surgical suite, where, after induction of general anesthesia, the patient was positioned in the prone position. The right leg was then prepped and draped in the usual sterile orthopedic fashion. After an appropriate timeout, and confirmation of administration of perioperative antibiotics, leg was exsanguinated, tourniquet taken to 250 mmHg. Vertical incision was then made just medial to midline at the level of the palpable rupture. Sharp dissection was carried through paratenon. Ruptured tendon ends were noted to be amenable to fixation. The tendon was mobilized. The wound was irrigated. I then utilized an Allis clamp to gain proximal control of the distal tendon edge.   I then placed a fiber tape in a Krackow fashion with three loops on the medial and lateral edge of the tendon. I then placed another fiber tape in the same fashion on the proximal tendon edge. This was tested and found to have excellent control of the tendon ends. I then tied the fiber tape free ends, with excellent approximation of the tendon ends, Elizalde test indicated good return of function, and tension was noted to be identical to the contralateral side. I then utilized a #1 vicryl to cerclage the approximated tendons, making a smooth repair. Soft tissues were infused with 0.5% ropivicaine, and wound was irrigate again. Tourniquet was released and all bleeding coagulated with electrocautery. Paratenon was closed with 2-0 vicryl. Skin closed with 2-0 vicryl, skin closed with 3-0 nylon. Sterile dressing applied, posterior slab splint applied with ankle at 45 degrees of plantar flexion. Patient was then awoken and taken to PACU in stable condition without complication. Postoperative plan: Patient will be toe touch weight bearing. The patient will follow up in 2 weeks for wound check, suture removal, DVT prophylaxis will be ecotrin.        [id number]

## 2022-11-09 ENCOUNTER — TELEPHONE (OUTPATIENT)
Dept: ORTHOPEDIC SURGERY | Age: 37
End: 2022-11-09

## 2022-11-09 NOTE — TELEPHONE ENCOUNTER
Patient had surgery on 11/07/22 for Right Achilles Tendon Repair. She is requesting a letter releasing her back to work on 11/14/22 for light seated duty.

## 2022-11-11 ENCOUNTER — TELEPHONE (OUTPATIENT)
Dept: ORTHOPEDIC SURGERY | Age: 37
End: 2022-11-11

## 2022-11-11 NOTE — TELEPHONE ENCOUNTER
Returned a phone call that was left by [facility] regarding getting confirmation that patient had successfully had her outpatient surgery on 11/0/22.  I spoke with Mazin Kraus and was able to confirm this information by providing the case number [id number]

## 2022-11-21 ENCOUNTER — OFFICE VISIT (OUTPATIENT)
Dept: ORTHOPEDIC SURGERY | Age: 37
End: 2022-11-21
Payer: COMMERCIAL

## 2022-11-21 VITALS
DIASTOLIC BLOOD PRESSURE: 89 MMHG | SYSTOLIC BLOOD PRESSURE: 145 MMHG | OXYGEN SATURATION: 97 % | BODY MASS INDEX: 43.47 KG/M2 | HEART RATE: 82 BPM | HEIGHT: 67 IN | TEMPERATURE: 97.1 F | WEIGHT: 277 LBS

## 2022-11-21 DIAGNOSIS — S86.011A ACHILLES TENDON TEAR, RIGHT, INITIAL ENCOUNTER: Primary | ICD-10-CM

## 2022-11-21 DIAGNOSIS — Z47.89 ORTHOPEDIC AFTERCARE: ICD-10-CM

## 2022-11-21 PROCEDURE — 99024 POSTOP FOLLOW-UP VISIT: CPT | Performed by: ORTHOPAEDIC SURGERY

## 2022-11-21 RX ORDER — HYDROCODONE BITARTRATE AND ACETAMINOPHEN 5; 325 MG/1; MG/1
1 TABLET ORAL
Qty: 21 TABLET | Refills: 0 | Status: SHIPPED | OUTPATIENT
Start: 2022-11-21 | End: 2022-11-28

## 2022-11-21 NOTE — PROGRESS NOTES
is here for a follow up visit from a left achilles tendon repair. Pain has been appropriate since surgery, no major medical complications since surgery. Current Outpatient Medications on File Prior to Visit   Medication Sig Dispense Refill    aspirin delayed-release 325 mg tablet Take 1 Tablet by mouth every six (6) hours as needed for Pain. 60 Tablet 0    acetaminophen (TYLENOL) 325 mg tablet Take  by mouth every four (4) hours as needed for Pain. No current facility-administered medications on file prior to visit. ROS:  General: denies agitation, major chest pain, unexpected weakness  Patient states appropriate pain  Skin: healing wound is without issue or drainage   Strength: appropriate weakness of involved extremity is resolving since surgery      Physical Examination:    Blood pressure (!) 145/89, pulse 82, temperature 97.1 °F (36.2 °C), temperature source Tympanic, height 5' 7\" (1.702 m), weight 277 lb (125.6 kg), SpO2 97 %.     Dressing: none  Skin: clean, dry, intact  Sensation intact to light touch at level of wound and distally  Strength is not tested, Elizalde test indicates intact achilles  Range of motion is not tested  Distal swelling is noted, but appropriate for postoperative course  Distal capillary refill less than 2 seconds      Imaging:    Postoperative imaging: none      Assessment: Status post achilles tendon repair    Plan:  Patient will not tstart physical therapy  Wound care was reviewed  Weightbearing will be none through the leg  Deep Venous Thrombosis Prophylaxis: none  Boot with two heel lifts given  Follow up will be at 2 weeks from now,  no xrays on follow up

## 2022-11-21 NOTE — PROGRESS NOTES
Identified pt with two pt identifiers (name and ). Reviewed chart in preparation for visit and have obtained necessary documentation. Katelyn Teran is a 40 y.o. female  Chief Complaint   Patient presents with    Surgical Follow-up     RT Achilles     Visit Vitals  BP (!) 145/89 (BP 1 Location: Left upper arm, BP Patient Position: Sitting, BP Cuff Size: Large adult)   Pulse 82   Temp 97.1 °F (36.2 °C) (Tympanic)   Ht 5' 7\" (1.702 m)   Wt 277 lb (125.6 kg)   SpO2 97%   BMI 43.38 kg/m²     1. Have you been to the ER, urgent care clinic since your last visit? Hospitalized since your last visit? No    2. Have you seen or consulted any other health care providers outside of the [address] [place] [address] since your last visit? Include any pap smears or colon screening.  No

## 2022-12-05 ENCOUNTER — OFFICE VISIT (OUTPATIENT)
Dept: ORTHOPEDIC SURGERY | Age: 37
End: 2022-12-05
Payer: COMMERCIAL

## 2022-12-05 VITALS
SYSTOLIC BLOOD PRESSURE: 141 MMHG | DIASTOLIC BLOOD PRESSURE: 72 MMHG | BODY MASS INDEX: 43.47 KG/M2 | OXYGEN SATURATION: 98 % | HEART RATE: 82 BPM | TEMPERATURE: 96.8 F | HEIGHT: 67 IN | WEIGHT: 277 LBS

## 2022-12-05 DIAGNOSIS — S86.011A ACHILLES TENDON TEAR, RIGHT, INITIAL ENCOUNTER: Primary | ICD-10-CM

## 2022-12-05 DIAGNOSIS — Z47.89 ORTHOPEDIC AFTERCARE: ICD-10-CM

## 2022-12-05 PROCEDURE — 99024 POSTOP FOLLOW-UP VISIT: CPT | Performed by: ORTHOPAEDIC SURGERY

## 2022-12-05 NOTE — PROGRESS NOTES
Identified pt with two pt identifiers (name and ). Reviewed chart in preparation for visit and have obtained necessary documentation. Raffaele Apple is a 40 y.o. female  Chief Complaint   Patient presents with    Surgical Follow-up     RT achilles     Visit Vitals  BP (!) 141/72 (BP 1 Location: Left upper arm, BP Patient Position: Sitting, BP Cuff Size: Large adult)   Pulse 82   Temp 96.8 °F (36 °C) (Tympanic)   Ht 5' 7\" (1.702 m)   Wt 277 lb (125.6 kg)   SpO2 98%   BMI 43.38 kg/m²     1. Have you been to the ER, urgent care clinic since your last visit? Hospitalized since your last visit? No    2. Have you seen or consulted any other health care providers outside of the [address] [place] [address] since your last visit? Include any pap smears or colon screening.  No

## 2022-12-05 NOTE — LETTER
12/5/2022 3:24 PM    Ms. Aditya Henson  [address]  [address] [place] [phone]        To whom it may concern,         Aditya Henson is under the care of [facility]. Tarah Miller will be released to work on 12/6/22 with the following restrictions: boot on at all times right foot. If you have any questions or concerns, please feel free to contact my office.                 Sincerely,      Megan Cadet, DO

## 2022-12-05 NOTE — PROGRESS NOTES
is here for a follow up visit from a right achilles tendon repair. Pain has been appropriate since surgery, no major medical complications since surgery. Current Outpatient Medications on File Prior to Visit   Medication Sig Dispense Refill    aspirin delayed-release 325 mg tablet Take 1 Tablet by mouth every six (6) hours as needed for Pain. 60 Tablet 0    acetaminophen (TYLENOL) 325 mg tablet Take  by mouth every four (4) hours as needed for Pain. No current facility-administered medications on file prior to visit. ROS:  General: denies agitation, major chest pain, unexpected weakness  Patient states no issues  Skin: healing wound is without issue or drainage   Strength: appropriate weakness of involved extremity is resolving since surgery      Physical Examination:    Blood pressure (!) 141/72, pulse 82, temperature 96.8 °F (36 °C), temperature source Tympanic, height 5' 7\" (1.702 m), weight 277 lb (125.6 kg), SpO2 98 %.     Dressing: none  Skin: clean, dry, intact  Sensation intact to light touch at level of wound and distally  Strength is not tested, but cain test indicates intact tendon  Range of motion is full  Distal swelling is noted, but appropriate for postoperative course  Distal capillary refill less than 2 seconds      Imaging:    Postoperative imaging: none      Assessment: Status post right achilles tendon repair    Plan:  Patient will start physical therapy  Wound care was reviewed  Weightbearing will be full through the boot and two wedges  Deep Venous Thrombosis Prophylaxis: none    Follow up will be at 4 weeks from now,  no xrays on follow up

## 2022-12-06 ENCOUNTER — TELEPHONE (OUTPATIENT)
Dept: ORTHOPEDIC SURGERY | Age: 37
End: 2022-12-06

## 2022-12-06 NOTE — TELEPHONE ENCOUNTER
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 The updated letter needs to be faxed to [phone]. Are you okay with all of this? Do you have restrictions in regards to the heavy machinery? Ill write the updated letter once you let me know.

## 2022-12-12 ENCOUNTER — TELEPHONE (OUTPATIENT)
Dept: ORTHOPEDIC SURGERY | Age: 37
End: 2022-12-12

## 2022-12-12 NOTE — TELEPHONE ENCOUNTER
Jayson virk [facility] called and requested the patient's demographics and most recent PT order to be faxed over to them at [phone]

## 2023-01-05 ENCOUNTER — OFFICE VISIT (OUTPATIENT)
Dept: ORTHOPEDIC SURGERY | Age: 38
End: 2023-01-05
Payer: COMMERCIAL

## 2023-01-05 VITALS
HEART RATE: 93 BPM | BODY MASS INDEX: 44.42 KG/M2 | HEIGHT: 67 IN | SYSTOLIC BLOOD PRESSURE: 121 MMHG | OXYGEN SATURATION: 97 % | DIASTOLIC BLOOD PRESSURE: 74 MMHG | WEIGHT: 283 LBS | TEMPERATURE: 97.9 F

## 2023-01-05 DIAGNOSIS — S86.011A ACHILLES TENDON TEAR, RIGHT, INITIAL ENCOUNTER: Primary | ICD-10-CM

## 2023-01-05 PROCEDURE — 99024 POSTOP FOLLOW-UP VISIT: CPT | Performed by: ORTHOPAEDIC SURGERY

## 2023-01-05 NOTE — LETTER
1/5/2023 2:37 PM    Ms. Ehsan Carter  [address]  [address] [place] [phone]    To whom it may concern,         Ehsan Carter is under the care of [address] [place] [address] Specialists. Tarah Miller will be released to work  with the following restrictions: none. If you have any questions or concerns, please feel free to contact my office.                 Sincerely,      Joseluis Davila, DO

## 2023-01-05 NOTE — PROGRESS NOTES
Identified pt with two pt identifiers (name and ). Reviewed chart in preparation for visit and have obtained necessary documentation. Kristi Dumont is a 40 y.o. female  Chief Complaint   Patient presents with    Surgical Follow-up     RT achilles     Visit Vitals  /74 (BP 1 Location: Left upper arm, BP Patient Position: Sitting, BP Cuff Size: Large adult)   Pulse 93   Temp 97.9 °F (36.6 °C) (Tympanic)   Ht 5' 7\" (1.702 m)   Wt 283 lb (128.4 kg)   SpO2 97%   BMI 44.32 kg/m²     1. Have you been to the ER, urgent care clinic since your last visit? Hospitalized since your last visit? No    2. Have you seen or consulted any other health care providers outside of the [address] [place] [address] since your last visit? Include any pap smears or colon screening.  No

## 2023-01-06 NOTE — PROGRESS NOTES
is here for a follow up visit from a right achilles repair. Pain has been appropriate since surgery, no major medical complications since surgery. Current Outpatient Medications on File Prior to Visit   Medication Sig Dispense Refill    aspirin delayed-release 325 mg tablet Take 1 Tablet by mouth every six (6) hours as needed for Pain. (Patient not taking: Reported on 1/5/2023) 60 Tablet 0    acetaminophen (TYLENOL) 325 mg tablet Take  by mouth every four (4) hours as needed for Pain. (Patient not taking: Reported on 1/5/2023)       No current facility-administered medications on file prior to visit. ROS:  General: denies agitation, major chest pain, unexpected weakness  Patient states appropriate pain  Skin: healing wound is without issue or drainage   Strength: appropriate weakness of involved extremity is resolving since surgery      Physical Examination:    Blood pressure 121/74, pulse 93, temperature 97.9 °F (36.6 °C), temperature source Tympanic, height 5' 7\" (1.702 m), weight 283 lb (128.4 kg), SpO2 97 %.     Dressing: none  Skin: clean, dry, intact  Sensation intact to light touch at level of wound and distally  Strength is 5/5 plantarflexion of ankle  Range of motion is full  Distal swelling is noted, but appropriate for postoperative course  Distal capillary refill less than 2 seconds      Imaging:    Postoperative imaging: none      Assessment: Status post left achilles tendon repair    Plan:  Patient will continue physical therapy  Wound care was reviewed  Weightbearing will be full through the leg  Deep Venous Thrombosis Prophylaxis: none    Follow up will be at 6 weeks from now,  no xrays on follow up

## 2023-02-21 ENCOUNTER — OFFICE VISIT (OUTPATIENT)
Dept: ORTHOPEDIC SURGERY | Age: 38
End: 2023-02-21
Payer: COMMERCIAL

## 2023-02-21 VITALS
DIASTOLIC BLOOD PRESSURE: 93 MMHG | WEIGHT: 283 LBS | BODY MASS INDEX: 44.42 KG/M2 | HEART RATE: 75 BPM | HEIGHT: 67 IN | SYSTOLIC BLOOD PRESSURE: 141 MMHG | OXYGEN SATURATION: 97 % | TEMPERATURE: 97.1 F

## 2023-02-21 DIAGNOSIS — Z47.89 ORTHOPEDIC AFTERCARE: ICD-10-CM

## 2023-02-21 DIAGNOSIS — S86.011A ACHILLES TENDON TEAR, RIGHT, INITIAL ENCOUNTER: Primary | ICD-10-CM

## 2023-02-21 PROCEDURE — 99212 OFFICE O/P EST SF 10 MIN: CPT | Performed by: ORTHOPAEDIC SURGERY

## 2023-02-21 NOTE — PROGRESS NOTES
Identified pt with two pt identifiers (name and ). Reviewed chart in preparation for visit and have obtained necessary documentation. Lula Webb is a 40 y.o. female  Chief Complaint   Patient presents with    Ankle Injury     Rt Ankle     Visit Vitals  BP (!) 141/93 (BP 1 Location: Right upper arm, BP Patient Position: Sitting, BP Cuff Size: Large adult)   Pulse 75   Temp 97.1 °F (36.2 °C) (Tympanic)   Ht 5' 7\" (1.702 m)   Wt 283 lb (128.4 kg)   SpO2 97%   BMI 44.32 kg/m²     1. Have you been to the ER, urgent care clinic since your last visit? Hospitalized since your last visit? No    2. Have you seen or consulted any other health care providers outside of the [address] [place] [address] since your last visit? Include any pap smears or colon screening. No  Patient and provider made aware of elevated BP x2. Patient asymptomatic. Patient reminded to monitor BP, continue to take BP medications if prescribed, and follow up with PCP/Cardiologist.  Patient expressed understanding and agreement.

## 2023-02-21 NOTE — PROGRESS NOTES
2/21/2023      CC: right achilles repair    HPI:      This is a 40y.o. year old female who presents for a follow up visit. The patient was last seen and diagnosed with right achilles repair. The patient's treatments since the most recent visit have comprised of PT. The patient has had good relief of the chief complaint. PMH:  History reviewed. No pertinent past medical history. PSxHx:  Past Surgical History:   Procedure Laterality Date    [place] UNLISTED PROCEDURE BREAST Left 2014       Meds:    Current Outpatient Medications:     aspirin delayed-release 325 mg tablet, Take 1 Tablet by mouth every six (6) hours as needed for Pain., Disp: 60 Tablet, Rfl: 0    acetaminophen (TYLENOL) 325 mg tablet, Take  by mouth every four (4) hours as needed for Pain., Disp: , Rfl:     All:  No Known Allergies    Social Hx:  Social History     Socioeconomic History    Marital status: SINGLE   Tobacco Use    Smoking status: Former     Packs/day: 1.00     Types: Cigarettes    Smokeless tobacco: Never   Vaping Use    Vaping Use: Never used   Substance and Sexual Activity    Alcohol use:  Yes     Alcohol/week: 1.0 standard drink     Types: 1 Standard drinks or equivalent per week     Comment: socially    Drug use: No    Sexual activity: Never     Birth control/protection: None       Family Hx:  Family History   Problem Relation Age of Onset    Hypertension Mother     Hypertension Father     Diabetes Father     Hypertension Paternal Aunt     Diabetes Paternal Aunt     Breast Cancer Maternal Grandmother     Diabetes Paternal Grandmother     Hypertension Paternal Grandmother          Review of Systems:       General: Denies headache, lethargy, fever, weight loss  Ears/Nose/Throat: Denies ear discharge, drainage, nosebleeds, hoarse voice, dental problems  Cardiovascular: Denies chest pain, shortness of breath  Lungs: Denies chest pain, breathing problems, wheezing, pneumonia  Stomach: Denies stomach pain, heartburn, constipation, irritable bowel  Skin: Denies rash, sores, open wounds  Musculoskeletal: no issues  Genitourinary: Denies dysuria, hematuria, polyuria  Gastrointestinal: Denies constipation, obstipation, diarrhea  Neurological: Denies changes in sight, smell, hearing, taste, seizures. Denies loss of consciousness. Psychiatric: Denies depression, sleep pattern changes, anxiety, change in personality  Endocrine: Denies mood swings, heat or cold intolerance  Hematologic/Lymphatic: Denies anemia, purpura, petechia  Allergic/Immunologic: Denies swelling of throat, pain or swelling at lymph nodes      Physical Examination:    Visit Vitals  BP (!) 141/93 (BP 1 Location: Right upper arm, BP Patient Position: Sitting, BP Cuff Size: Large adult)   Pulse 75   Temp 97.1 °F (36.2 °C) (Tympanic)   Ht 5' 7\" (1.702 m)   Wt 283 lb (128.4 kg)   SpO2 97%   BMI 44.32 kg/m²        General: AOX3, no apparent distress  Psychiatric: mood and affect appropriate  Lungs: breathing is symmetric and unlabored bilaterally  Heart: regular rate and rhythm  Abdomen: no guarding  Head: normocephalic, atraumatic  Skin: No significant abnormalities, good turgor  Sensation intact to light touch: C5-T1 dermatomes  Muscular exam: 5/5 strength in all major muscle groups unless noted in specialty exam.    Extremities        Left lower extremity:  No gross deformity. No restriction to range of motion of the hip, knee, ankle. Muscle bulk is appropriate without wasting. Sensation is intact to light touch in the L1-S1 dermatomes. Capillary refill is less than 2 seconds in the fingers. Strength testing is 5/5 at the major muscle groups of the hip, knee, ankle. Right lower extremity: No gross deformity. No restriction to range of motion of the hip, knee, ankle. Muscle bulk is appropriate without wasting. Sensation is intact to light touch in the L1-S1 dermatomes. Capillary refill is less than 2 seconds in the fingers.   Strength testing is 5/5 at the major muscle groups of the hip, knee, ankle. Diagnostics:    Pertinent Diagnostics: none today    Assessment: right achilles tendon repair  Plan: This patient is doing very well, she has no restrictions on my part, plan be to have her return to full function without any restrictions whatsoever, she will continue to strengthen and stretch this, she is aware that it may take another 3 to 6 months before complete return of all function, however I do not have any restrictions on her. She stated understanding and satisfaction and she will follow-up as needed. Ms. Berlin Monroe has a reminder for a \"due or due soon\" health maintenance. I have asked that she contact her primary care provider for follow-up on this health maintenance.

## 2024-06-02 ENCOUNTER — HOSPITAL ENCOUNTER (EMERGENCY)
Facility: HOSPITAL | Age: 39
Discharge: HOME | End: 2024-06-02
Attending: EMERGENCY MEDICINE

## 2024-06-02 VITALS
HEIGHT: 66 IN | TEMPERATURE: 97.8 F | WEIGHT: 282 LBS | HEART RATE: 93 BPM | BODY MASS INDEX: 45.32 KG/M2 | SYSTOLIC BLOOD PRESSURE: 144 MMHG | OXYGEN SATURATION: 97 % | RESPIRATION RATE: 18 BRPM | DIASTOLIC BLOOD PRESSURE: 85 MMHG
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
## 2024-06-02 PROCEDURE — 99282 EMERGENCY DEPT VISIT SF MDM: CPT

## 2024-06-02 ASSESSMENT — PAIN - FUNCTIONAL ASSESSMENT: PAIN_FUNCTIONAL_ASSESSMENT: NONE - DENIES PAIN

## 2024-06-02 NOTE — ED NOTES
Discharge instructions were given to the patient by Fredis JAMES     The patient left the Emergency Department alert and oriented and in no acute distress with 0 prescriptions. The patient was encouraged to call or return to the ED for worsening issues or problems and was encouraged to schedule a follow up appointment for continuing care.     Ambulation assessment completed before discharge.  Pt left Emergency Department ambulating at baseline with no ortho devices  Ortho device education: none    The patient verbalized understanding of discharge instructions and prescriptions, all questions were answered. The patient has no further concerns at this time.
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
## (undated) DEVICE — SUTURE NONABSORBABLE MONOFILAMENT 3-0 PS-1 18 IN BLK ETHILON 1663H

## (undated) DEVICE — BANDAGE,ELASTIC,ESMARK,STERILE,4"X9',LF: Brand: MEDLINE

## (undated) DEVICE — SMOKE EVACUATION PENCIL: Brand: VALLEYLAB

## (undated) DEVICE — REM POLYHESIVE ADULT PATIENT RETURN ELECTRODE: Brand: VALLEYLAB

## (undated) DEVICE — TRANSFER SET 3": Brand: MEDLINE INDUSTRIES, INC.

## (undated) DEVICE — TRAP FLUID BUFFALO FLTR

## (undated) DEVICE — Device

## (undated) DEVICE — PADDING CAST W4INXL4YD ST COT RAYON MICROPLEATED HIGHLY

## (undated) DEVICE — DRESSING,GAUZE,XEROFORM,CURAD,1"X8",ST: Brand: CURAD

## (undated) DEVICE — SOLUTION IRRIG 1000ML 0.9% SOD CHL USP POUR PLAS BTL

## (undated) DEVICE — SYR LR LCK 1ML GRAD NSAF 30ML --

## (undated) DEVICE — 4-PORT MANIFOLD: Brand: NEPTUNE 2

## (undated) DEVICE — INTENT TO BE USED WITH SUTURE MATERIAL FOR TISSUE CLOSURE: Brand: RICHARD-ALLAN® NEEDLE 1/2 CIRCLE TAPER

## (undated) DEVICE — SUTURE VCRL SZ 2-0 L27IN ABSRB UD L26MM SH 1/2 CIR J417H

## (undated) DEVICE — GOWN,SIRUS,NONRNF,XLN/2XL,18/CS: Brand: MEDLINE

## (undated) DEVICE — SUTURE VCRL SZ 1 L36IN ABSRB UD L36MM CT-1 1/2 CIR J947H

## (undated) DEVICE — EXTREMITY-MRMC: Brand: MEDLINE INDUSTRIES, INC.

## (undated) DEVICE — PAD,ABDOMINAL,5"X9",ST,LF,25/BX: Brand: MEDLINE INDUSTRIES, INC.

## (undated) DEVICE — ZIMMER® STERILE DISPOSABLE TOURNIQUET CUFF WITH PROTECTIVE SLEEVE AND PLC, DUAL PORT, SINGLE BLADDER, 34 IN. (86 CM)

## (undated) DEVICE — GLOVE SURG SZ 85 L12IN FNGR THK79MIL GRN LTX FREE

## (undated) DEVICE — GLOVE SURG SZ 85 L12IN FNGR ORTHO 126MIL CRM LTX FREE

## (undated) DEVICE — BRUSH SCRB 4% CHG RED DISP --

## (undated) DEVICE — BNDG ELAS HK LOOP 6X5YD NS -- MATRIX

## (undated) DEVICE — SOLUTION SURG PREP 26 CC PURPREP

## (undated) DEVICE — WILSON FRAME STYLE POSITIONING KITS - 	FRAME PAD SLEEVES (SET OF 2) , DRAPE PROTECTOR, BAR PROTECTOR 7" COMFORT FOAM HEADREST, LAMINECTOMY ARM CRADLES: Brand: SOULE MEDICAL